# Patient Record
Sex: MALE | Race: WHITE | NOT HISPANIC OR LATINO | Employment: FULL TIME | URBAN - METROPOLITAN AREA
[De-identification: names, ages, dates, MRNs, and addresses within clinical notes are randomized per-mention and may not be internally consistent; named-entity substitution may affect disease eponyms.]

---

## 2017-01-13 ENCOUNTER — OFFICE VISIT (OUTPATIENT)
Dept: URGENT CARE | Facility: CLINIC | Age: 27
End: 2017-01-13
Payer: COMMERCIAL

## 2017-01-13 PROCEDURE — 99204 OFFICE O/P NEW MOD 45 MIN: CPT

## 2017-06-21 ENCOUNTER — OFFICE VISIT (OUTPATIENT)
Dept: URGENT CARE | Facility: CLINIC | Age: 27
End: 2017-06-21
Payer: COMMERCIAL

## 2017-06-21 PROCEDURE — 99213 OFFICE O/P EST LOW 20 MIN: CPT

## 2017-10-09 ENCOUNTER — APPOINTMENT (OUTPATIENT)
Dept: PHYSICAL THERAPY | Facility: CLINIC | Age: 27
End: 2017-10-09
Payer: COMMERCIAL

## 2017-10-09 PROCEDURE — G8991 OTHER PT/OT GOAL STATUS: HCPCS

## 2017-10-09 PROCEDURE — G8990 OTHER PT/OT CURRENT STATUS: HCPCS

## 2017-10-09 PROCEDURE — 97162 PT EVAL MOD COMPLEX 30 MIN: CPT

## 2017-10-09 PROCEDURE — 97110 THERAPEUTIC EXERCISES: CPT

## 2017-10-12 ENCOUNTER — APPOINTMENT (OUTPATIENT)
Dept: PHYSICAL THERAPY | Facility: CLINIC | Age: 27
End: 2017-10-12
Payer: COMMERCIAL

## 2017-10-12 PROCEDURE — 97140 MANUAL THERAPY 1/> REGIONS: CPT

## 2017-10-12 PROCEDURE — 97110 THERAPEUTIC EXERCISES: CPT

## 2017-10-18 ENCOUNTER — APPOINTMENT (OUTPATIENT)
Dept: PHYSICAL THERAPY | Facility: CLINIC | Age: 27
End: 2017-10-18
Payer: COMMERCIAL

## 2017-10-19 ENCOUNTER — APPOINTMENT (OUTPATIENT)
Dept: PHYSICAL THERAPY | Facility: CLINIC | Age: 27
End: 2017-10-19
Payer: COMMERCIAL

## 2018-10-30 ENCOUNTER — TRANSCRIBE ORDERS (OUTPATIENT)
Dept: URGENT CARE | Facility: CLINIC | Age: 28
End: 2018-10-30

## 2018-10-30 ENCOUNTER — APPOINTMENT (OUTPATIENT)
Dept: LAB | Facility: CLINIC | Age: 28
End: 2018-10-30
Payer: COMMERCIAL

## 2018-10-30 DIAGNOSIS — Z00.00 ROUTINE GENERAL MEDICAL EXAMINATION AT A HEALTH CARE FACILITY: Primary | ICD-10-CM

## 2018-10-30 LAB
25(OH)D3 SERPL-MCNC: 17.3 NG/ML (ref 30–100)
ALBUMIN SERPL BCP-MCNC: 4.1 G/DL (ref 3.5–5)
ALP SERPL-CCNC: 66 U/L (ref 46–116)
ALT SERPL W P-5'-P-CCNC: 61 U/L (ref 12–78)
ANION GAP SERPL CALCULATED.3IONS-SCNC: 4 MMOL/L (ref 4–13)
AST SERPL W P-5'-P-CCNC: 28 U/L (ref 5–45)
BASOPHILS # BLD AUTO: 0.03 THOUSANDS/ΜL (ref 0–0.1)
BASOPHILS NFR BLD AUTO: 1 % (ref 0–1)
BILIRUB DIRECT SERPL-MCNC: 0.18 MG/DL (ref 0–0.2)
BILIRUB SERPL-MCNC: 0.74 MG/DL (ref 0.2–1)
BUN SERPL-MCNC: 13 MG/DL (ref 5–25)
CALCIUM SERPL-MCNC: 9.1 MG/DL (ref 8.3–10.1)
CHLORIDE SERPL-SCNC: 101 MMOL/L (ref 100–108)
CHOLEST SERPL-MCNC: 240 MG/DL (ref 50–200)
CO2 SERPL-SCNC: 29 MMOL/L (ref 21–32)
CREAT SERPL-MCNC: 0.97 MG/DL (ref 0.6–1.3)
EOSINOPHIL # BLD AUTO: 0.08 THOUSAND/ΜL (ref 0–0.61)
EOSINOPHIL NFR BLD AUTO: 1 % (ref 0–6)
ERYTHROCYTE [DISTWIDTH] IN BLOOD BY AUTOMATED COUNT: 13 % (ref 11.6–15.1)
FERRITIN SERPL-MCNC: 90 NG/ML (ref 8–388)
GFR SERPL CREATININE-BSD FRML MDRD: 105 ML/MIN/1.73SQ M
GLUCOSE P FAST SERPL-MCNC: 88 MG/DL (ref 65–99)
HCT VFR BLD AUTO: 46.8 % (ref 36.5–49.3)
HDLC SERPL-MCNC: 40 MG/DL (ref 40–60)
HGB BLD-MCNC: 15.5 G/DL (ref 12–17)
IMM GRANULOCYTES # BLD AUTO: 0.01 THOUSAND/UL (ref 0–0.2)
IMM GRANULOCYTES NFR BLD AUTO: 0 % (ref 0–2)
IRON SATN MFR SERPL: 47 %
IRON SERPL-MCNC: 156 UG/DL (ref 65–175)
LDLC SERPL CALC-MCNC: 161 MG/DL (ref 0–100)
LYMPHOCYTES # BLD AUTO: 1.93 THOUSANDS/ΜL (ref 0.6–4.47)
LYMPHOCYTES NFR BLD AUTO: 31 % (ref 14–44)
MCH RBC QN AUTO: 29.6 PG (ref 26.8–34.3)
MCHC RBC AUTO-ENTMCNC: 33.1 G/DL (ref 31.4–37.4)
MCV RBC AUTO: 89 FL (ref 82–98)
MONOCYTES # BLD AUTO: 0.62 THOUSAND/ΜL (ref 0.17–1.22)
MONOCYTES NFR BLD AUTO: 10 % (ref 4–12)
NEUTROPHILS # BLD AUTO: 3.53 THOUSANDS/ΜL (ref 1.85–7.62)
NEUTS SEG NFR BLD AUTO: 57 % (ref 43–75)
NONHDLC SERPL-MCNC: 200 MG/DL
NRBC BLD AUTO-RTO: 0 /100 WBCS
PLATELET # BLD AUTO: 252 THOUSANDS/UL (ref 149–390)
PMV BLD AUTO: 10.5 FL (ref 8.9–12.7)
POTASSIUM SERPL-SCNC: 4.2 MMOL/L (ref 3.5–5.3)
PROT SERPL-MCNC: 8.2 G/DL (ref 6.4–8.2)
RBC # BLD AUTO: 5.24 MILLION/UL (ref 3.88–5.62)
SODIUM SERPL-SCNC: 134 MMOL/L (ref 136–145)
TIBC SERPL-MCNC: 332 UG/DL (ref 250–450)
TRIGL SERPL-MCNC: 193 MG/DL
VIT B12 SERPL-MCNC: 675 PG/ML (ref 100–900)
WBC # BLD AUTO: 6.2 THOUSAND/UL (ref 4.31–10.16)

## 2018-10-30 PROCEDURE — 80048 BASIC METABOLIC PNL TOTAL CA: CPT

## 2018-10-30 PROCEDURE — 86480 TB TEST CELL IMMUN MEASURE: CPT

## 2018-10-30 PROCEDURE — 80061 LIPID PANEL: CPT

## 2018-10-30 PROCEDURE — 83550 IRON BINDING TEST: CPT

## 2018-10-30 PROCEDURE — 36415 COLL VENOUS BLD VENIPUNCTURE: CPT

## 2018-10-30 PROCEDURE — 82607 VITAMIN B-12: CPT

## 2018-10-30 PROCEDURE — 82306 VITAMIN D 25 HYDROXY: CPT

## 2018-10-30 PROCEDURE — 82728 ASSAY OF FERRITIN: CPT

## 2018-10-30 PROCEDURE — 83540 ASSAY OF IRON: CPT

## 2018-10-30 PROCEDURE — 85025 COMPLETE CBC W/AUTO DIFF WBC: CPT

## 2018-10-30 PROCEDURE — 80076 HEPATIC FUNCTION PANEL: CPT

## 2018-11-01 LAB
GAMMA INTERFERON BACKGROUND BLD IA-ACNC: 0.05 IU/ML
M TB IFN-G BLD-IMP: NEGATIVE
M TB IFN-G CD4+ BCKGRND COR BLD-ACNC: 0 IU/ML
M TB IFN-G CD4+ BCKGRND COR BLD-ACNC: 0 IU/ML
MITOGEN IGNF BCKGRD COR BLD-ACNC: >10 IU/ML

## 2019-10-01 ENCOUNTER — TRANSCRIBE ORDERS (OUTPATIENT)
Dept: LAB | Facility: CLINIC | Age: 29
End: 2019-10-01

## 2019-10-01 ENCOUNTER — APPOINTMENT (OUTPATIENT)
Dept: LAB | Facility: CLINIC | Age: 29
End: 2019-10-01
Payer: COMMERCIAL

## 2019-10-01 ENCOUNTER — OFFICE VISIT (OUTPATIENT)
Dept: FAMILY MEDICINE CLINIC | Facility: CLINIC | Age: 29
End: 2019-10-01
Payer: COMMERCIAL

## 2019-10-01 VITALS
TEMPERATURE: 97.8 F | SYSTOLIC BLOOD PRESSURE: 140 MMHG | WEIGHT: 228 LBS | HEIGHT: 77 IN | DIASTOLIC BLOOD PRESSURE: 90 MMHG | BODY MASS INDEX: 26.92 KG/M2

## 2019-10-01 DIAGNOSIS — Z13.31 NEGATIVE DEPRESSION SCREENING: ICD-10-CM

## 2019-10-01 DIAGNOSIS — Z11.1 SCREENING FOR TUBERCULOSIS: ICD-10-CM

## 2019-10-01 DIAGNOSIS — E66.3 OVERWEIGHT (BMI 25.0-29.9): ICD-10-CM

## 2019-10-01 DIAGNOSIS — A15.9 TUBERCULOSIS: ICD-10-CM

## 2019-10-01 DIAGNOSIS — Z13.6 SCREENING FOR CARDIOVASCULAR CONDITION: ICD-10-CM

## 2019-10-01 DIAGNOSIS — E78.00 HYPERCHOLESTEROLEMIA: Primary | ICD-10-CM

## 2019-10-01 DIAGNOSIS — I10 ESSENTIAL HYPERTENSION: ICD-10-CM

## 2019-10-01 LAB
ALBUMIN SERPL BCP-MCNC: 4.1 G/DL (ref 3.5–5)
ALP SERPL-CCNC: 55 U/L (ref 46–116)
ALT SERPL W P-5'-P-CCNC: 23 U/L (ref 12–78)
ANION GAP SERPL CALCULATED.3IONS-SCNC: 5 MMOL/L (ref 4–13)
AST SERPL W P-5'-P-CCNC: 20 U/L (ref 5–45)
BASOPHILS # BLD AUTO: 0.03 THOUSANDS/ΜL (ref 0–0.1)
BASOPHILS NFR BLD AUTO: 1 % (ref 0–1)
BILIRUB SERPL-MCNC: 0.46 MG/DL (ref 0.2–1)
BUN SERPL-MCNC: 20 MG/DL (ref 5–25)
CALCIUM SERPL-MCNC: 9.3 MG/DL (ref 8.3–10.1)
CHLORIDE SERPL-SCNC: 106 MMOL/L (ref 100–108)
CHOLEST SERPL-MCNC: 192 MG/DL (ref 50–200)
CO2 SERPL-SCNC: 26 MMOL/L (ref 21–32)
CREAT SERPL-MCNC: 0.9 MG/DL (ref 0.6–1.3)
EOSINOPHIL # BLD AUTO: 0.1 THOUSAND/ΜL (ref 0–0.61)
EOSINOPHIL NFR BLD AUTO: 2 % (ref 0–6)
ERYTHROCYTE [DISTWIDTH] IN BLOOD BY AUTOMATED COUNT: 13.2 % (ref 11.6–15.1)
GFR SERPL CREATININE-BSD FRML MDRD: 114 ML/MIN/1.73SQ M
GLUCOSE P FAST SERPL-MCNC: 80 MG/DL (ref 65–99)
HCT VFR BLD AUTO: 44.7 % (ref 36.5–49.3)
HDLC SERPL-MCNC: 40 MG/DL (ref 40–60)
HGB BLD-MCNC: 14.8 G/DL (ref 12–17)
IMM GRANULOCYTES # BLD AUTO: 0.01 THOUSAND/UL (ref 0–0.2)
IMM GRANULOCYTES NFR BLD AUTO: 0 % (ref 0–2)
LDLC SERPL CALC-MCNC: 139 MG/DL (ref 0–100)
LYMPHOCYTES # BLD AUTO: 1.5 THOUSANDS/ΜL (ref 0.6–4.47)
LYMPHOCYTES NFR BLD AUTO: 26 % (ref 14–44)
MCH RBC QN AUTO: 30.5 PG (ref 26.8–34.3)
MCHC RBC AUTO-ENTMCNC: 33.1 G/DL (ref 31.4–37.4)
MCV RBC AUTO: 92 FL (ref 82–98)
MONOCYTES # BLD AUTO: 0.57 THOUSAND/ΜL (ref 0.17–1.22)
MONOCYTES NFR BLD AUTO: 10 % (ref 4–12)
NEUTROPHILS # BLD AUTO: 3.56 THOUSANDS/ΜL (ref 1.85–7.62)
NEUTS SEG NFR BLD AUTO: 61 % (ref 43–75)
NONHDLC SERPL-MCNC: 152 MG/DL
NRBC BLD AUTO-RTO: 0 /100 WBCS
PLATELET # BLD AUTO: 215 THOUSANDS/UL (ref 149–390)
PMV BLD AUTO: 11.3 FL (ref 8.9–12.7)
POTASSIUM SERPL-SCNC: 3.7 MMOL/L (ref 3.5–5.3)
PROT SERPL-MCNC: 7.6 G/DL (ref 6.4–8.2)
RBC # BLD AUTO: 4.86 MILLION/UL (ref 3.88–5.62)
SODIUM SERPL-SCNC: 137 MMOL/L (ref 136–145)
TRIGL SERPL-MCNC: 66 MG/DL
TSH SERPL DL<=0.05 MIU/L-ACNC: 2.11 UIU/ML (ref 0.36–3.74)
WBC # BLD AUTO: 5.77 THOUSAND/UL (ref 4.31–10.16)

## 2019-10-01 PROCEDURE — 80053 COMPREHEN METABOLIC PANEL: CPT

## 2019-10-01 PROCEDURE — 36415 COLL VENOUS BLD VENIPUNCTURE: CPT

## 2019-10-01 PROCEDURE — 80061 LIPID PANEL: CPT

## 2019-10-01 PROCEDURE — 3008F BODY MASS INDEX DOCD: CPT | Performed by: FAMILY MEDICINE

## 2019-10-01 PROCEDURE — 84443 ASSAY THYROID STIM HORMONE: CPT

## 2019-10-01 PROCEDURE — 86480 TB TEST CELL IMMUN MEASURE: CPT

## 2019-10-01 PROCEDURE — 99203 OFFICE O/P NEW LOW 30 MIN: CPT | Performed by: FAMILY MEDICINE

## 2019-10-01 PROCEDURE — 85025 COMPLETE CBC W/AUTO DIFF WBC: CPT

## 2019-10-01 NOTE — PROGRESS NOTES
Assessment/Plan:    No problem-specific Assessment & Plan notes found for this encounter  Diagnoses and all orders for this visit:    Hypercholesterolemia    Essential hypertension    Screening for cardiovascular condition  -     CBC and differential; Future  -     Comprehensive metabolic panel; Future  -     Lipid panel; Future  -     TSH, 3rd generation with Free T4 reflex; Future    Overweight (BMI 25 0-29  9)    Negative depression screening          PHQ-9 Depression Screening    PHQ-9:    Frequency of the following problems over the past two weeks:       Little interest or pleasure in doing things:  0 - not at all  Feeling down, depressed, or hopeless:  0 - not at all  PHQ-2 Score:  0        BMI Counseling: Body mass index is 27 39 kg/m²  The BMI is above normal  Nutrition recommendations include reducing portion sizes and 3-5 servings of fruits/vegetables daily  Exercise recommendations include moderate aerobic physical activity for 150 minutes/week and exercising 3-5 times per week  Subjective:      Patient ID: Erin Almanza is a 26 y o  male  New pt here to establish with high cholesterol, low vitamin D, facial dermatitis, seasonal affective disorder, no past surgeries, quit smoking 4 years ago, reg EtoH, no illegal drugs, , 3 children, medical device rep      The following portions of the patient's history were reviewed and updated as appropriate: allergies, current medications, past family history, past medical history, past social history, past surgical history and problem list     Review of Systems   Constitutional: Positive for fatigue  Negative for chills and fever  HENT: Negative  Eyes: Negative  Respiratory: Negative for shortness of breath and wheezing  Cardiovascular: Negative for chest pain and palpitations  Gastrointestinal: Negative for abdominal pain, blood in stool, constipation, diarrhea, nausea and vomiting  Endocrine: Negative      Genitourinary: Negative for difficulty urinating and dysuria  Musculoskeletal: Negative for arthralgias and myalgias  Skin: Negative  Allergic/Immunologic: Negative  Neurological: Negative for seizures and syncope  Hematological: Negative for adenopathy  Psychiatric/Behavioral: Negative  Objective:    /90   Temp 97 8 °F (36 6 °C)   Ht 6' 4 5" (1 943 m)   Wt 103 kg (228 lb)   BMI 27 39 kg/m²      Physical Exam   Constitutional: He is oriented to person, place, and time  He appears well-developed and well-nourished  No distress  HENT:   Head: Normocephalic and atraumatic  Right Ear: External ear normal    Left Ear: External ear normal    Nose: Nose normal    Mouth/Throat: Oropharynx is clear and moist    Eyes: Pupils are equal, round, and reactive to light  Conjunctivae and EOM are normal  No scleral icterus  Neck: Normal range of motion  Neck supple  Cardiovascular: Normal rate, regular rhythm and normal heart sounds  Exam reveals no gallop and no friction rub  No murmur heard  Pulmonary/Chest: Effort normal and breath sounds normal  No respiratory distress  He has no wheezes  He has no rales  Abdominal: Soft  Bowel sounds are normal  He exhibits no distension and no mass  There is no tenderness  There is no rebound and no guarding  Musculoskeletal: Normal range of motion  He exhibits no edema  Lymphadenopathy:     He has no cervical adenopathy  Neurological: He is alert and oriented to person, place, and time  He has normal reflexes  Skin: Skin is warm and dry  He is not diaphoretic  Psychiatric: He has a normal mood and affect   His behavior is normal  Judgment and thought content normal

## 2019-10-02 LAB
GAMMA INTERFERON BACKGROUND BLD IA-ACNC: 0.06 IU/ML
M TB IFN-G BLD-IMP: NEGATIVE
M TB IFN-G CD4+ BCKGRND COR BLD-ACNC: 0 IU/ML
M TB IFN-G CD4+ BCKGRND COR BLD-ACNC: 0 IU/ML
MITOGEN IGNF BCKGRD COR BLD-ACNC: >10 IU/ML

## 2020-11-23 ENCOUNTER — OFFICE VISIT (OUTPATIENT)
Dept: URGENT CARE | Facility: CLINIC | Age: 30
End: 2020-11-23
Payer: COMMERCIAL

## 2020-11-23 VITALS
HEART RATE: 78 BPM | SYSTOLIC BLOOD PRESSURE: 157 MMHG | TEMPERATURE: 97.8 F | DIASTOLIC BLOOD PRESSURE: 92 MMHG | OXYGEN SATURATION: 98 % | RESPIRATION RATE: 18 BRPM

## 2020-11-23 DIAGNOSIS — B02.9 HERPES ZOSTER WITHOUT COMPLICATION: Primary | ICD-10-CM

## 2020-11-23 PROCEDURE — G0382 LEV 3 HOSP TYPE B ED VISIT: HCPCS | Performed by: NURSE PRACTITIONER

## 2020-11-23 RX ORDER — VALACYCLOVIR HYDROCHLORIDE 1 G/1
1000 TABLET, FILM COATED ORAL 3 TIMES DAILY
Qty: 21 TABLET | Refills: 0 | Status: SHIPPED | OUTPATIENT
Start: 2020-11-23 | End: 2020-11-30

## 2024-04-09 ENCOUNTER — HOSPITAL ENCOUNTER (EMERGENCY)
Facility: HOSPITAL | Age: 34
Discharge: HOME | End: 2024-04-09
Attending: EMERGENCY MEDICINE
Payer: COMMERCIAL

## 2024-04-09 ENCOUNTER — APPOINTMENT (EMERGENCY)
Dept: CT IMAGING | Facility: HOSPITAL | Age: 34
End: 2024-04-09
Payer: COMMERCIAL

## 2024-04-09 VITALS
RESPIRATION RATE: 18 BRPM | TEMPERATURE: 97.1 F | OXYGEN SATURATION: 98 % | SYSTOLIC BLOOD PRESSURE: 139 MMHG | BODY MASS INDEX: 28.46 KG/M2 | DIASTOLIC BLOOD PRESSURE: 86 MMHG | HEIGHT: 77 IN | HEART RATE: 78 BPM | WEIGHT: 241 LBS

## 2024-04-09 DIAGNOSIS — R10.9 ABDOMINAL PAIN: Primary | ICD-10-CM

## 2024-04-09 LAB
ALBUMIN SERPL BCP-MCNC: 5 G/DL (ref 3.5–5)
ALP SERPL-CCNC: 71 U/L (ref 34–104)
ALT SERPL W P-5'-P-CCNC: 22 U/L (ref 7–52)
ANION GAP SERPL CALCULATED.3IONS-SCNC: 9 MMOL/L (ref 4–13)
AST SERPL W P-5'-P-CCNC: 16 U/L (ref 13–39)
BASOPHILS # BLD AUTO: 0.03 THOUSANDS/ÂΜL (ref 0–0.1)
BASOPHILS NFR BLD AUTO: 0 % (ref 0–1)
BILIRUB SERPL-MCNC: 0.57 MG/DL (ref 0.2–1)
BILIRUB UR QL STRIP: NEGATIVE
BUN SERPL-MCNC: 14 MG/DL (ref 5–25)
CALCIUM SERPL-MCNC: 9.8 MG/DL (ref 8.4–10.2)
CHLORIDE SERPL-SCNC: 101 MMOL/L (ref 96–108)
CLARITY UR: CLEAR
CO2 SERPL-SCNC: 27 MMOL/L (ref 21–32)
COLOR UR: YELLOW
CREAT SERPL-MCNC: 0.94 MG/DL (ref 0.6–1.3)
EOSINOPHIL # BLD AUTO: 0.06 THOUSAND/ÂΜL (ref 0–0.61)
EOSINOPHIL NFR BLD AUTO: 1 % (ref 0–6)
ERYTHROCYTE [DISTWIDTH] IN BLOOD BY AUTOMATED COUNT: 12.6 % (ref 11.6–15.1)
GFR SERPL CREATININE-BSD FRML MDRD: 105 ML/MIN/1.73SQ M
GLUCOSE SERPL-MCNC: 118 MG/DL (ref 65–140)
GLUCOSE UR STRIP-MCNC: NEGATIVE MG/DL
HCT VFR BLD AUTO: 47.9 % (ref 36.5–49.3)
HGB BLD-MCNC: 16.2 G/DL (ref 12–17)
HGB UR QL STRIP.AUTO: NEGATIVE
IMM GRANULOCYTES # BLD AUTO: 0.02 THOUSAND/UL (ref 0–0.2)
IMM GRANULOCYTES NFR BLD AUTO: 0 % (ref 0–2)
KETONES UR STRIP-MCNC: NEGATIVE MG/DL
LEUKOCYTE ESTERASE UR QL STRIP: NEGATIVE
LIPASE SERPL-CCNC: 14 U/L (ref 11–82)
LYMPHOCYTES # BLD AUTO: 2.48 THOUSANDS/ÂΜL (ref 0.6–4.47)
LYMPHOCYTES NFR BLD AUTO: 27 % (ref 14–44)
MCH RBC QN AUTO: 30.6 PG (ref 26.8–34.3)
MCHC RBC AUTO-ENTMCNC: 33.8 G/DL (ref 31.4–37.4)
MCV RBC AUTO: 90 FL (ref 82–98)
MONOCYTES # BLD AUTO: 0.67 THOUSAND/ÂΜL (ref 0.17–1.22)
MONOCYTES NFR BLD AUTO: 7 % (ref 4–12)
NEUTROPHILS # BLD AUTO: 6 THOUSANDS/ÂΜL (ref 1.85–7.62)
NEUTS SEG NFR BLD AUTO: 65 % (ref 43–75)
NITRITE UR QL STRIP: NEGATIVE
NRBC BLD AUTO-RTO: 0 /100 WBCS
PH UR STRIP.AUTO: 7.5 [PH]
PLATELET # BLD AUTO: 305 THOUSANDS/UL (ref 149–390)
PMV BLD AUTO: 9.2 FL (ref 8.9–12.7)
POTASSIUM SERPL-SCNC: 4.1 MMOL/L (ref 3.5–5.3)
PROT SERPL-MCNC: 8.2 G/DL (ref 6.4–8.4)
PROT UR STRIP-MCNC: NEGATIVE MG/DL
RBC # BLD AUTO: 5.3 MILLION/UL (ref 3.88–5.62)
SODIUM SERPL-SCNC: 137 MMOL/L (ref 135–147)
SP GR UR STRIP.AUTO: 1.01
UROBILINOGEN UR QL STRIP.AUTO: 0.2 E.U./DL
WBC # BLD AUTO: 9.26 THOUSAND/UL (ref 4.31–10.16)

## 2024-04-09 PROCEDURE — 85025 COMPLETE CBC W/AUTO DIFF WBC: CPT | Performed by: EMERGENCY MEDICINE

## 2024-04-09 PROCEDURE — 81003 URINALYSIS AUTO W/O SCOPE: CPT | Performed by: EMERGENCY MEDICINE

## 2024-04-09 PROCEDURE — 80053 COMPREHEN METABOLIC PANEL: CPT | Performed by: EMERGENCY MEDICINE

## 2024-04-09 PROCEDURE — 99285 EMERGENCY DEPT VISIT HI MDM: CPT | Performed by: EMERGENCY MEDICINE

## 2024-04-09 PROCEDURE — 36415 COLL VENOUS BLD VENIPUNCTURE: CPT | Performed by: EMERGENCY MEDICINE

## 2024-04-09 PROCEDURE — 74177 CT ABD & PELVIS W/CONTRAST: CPT

## 2024-04-09 PROCEDURE — 83690 ASSAY OF LIPASE: CPT | Performed by: EMERGENCY MEDICINE

## 2024-04-09 RX ORDER — HYDROCODONE BITARTRATE AND ACETAMINOPHEN 5; 325 MG/1; MG/1
1 TABLET ORAL EVERY 6 HOURS PRN
Qty: 10 TABLET | Refills: 0 | Status: SHIPPED | OUTPATIENT
Start: 2024-04-09

## 2024-04-09 RX ORDER — AMOXICILLIN AND CLAVULANATE POTASSIUM 875; 125 MG/1; MG/1
1 TABLET, FILM COATED ORAL EVERY 12 HOURS
Qty: 20 TABLET | Refills: 0 | Status: SHIPPED | OUTPATIENT
Start: 2024-04-09 | End: 2024-04-19

## 2024-04-09 RX ORDER — ONDANSETRON 2 MG/ML
4 INJECTION INTRAMUSCULAR; INTRAVENOUS ONCE
Status: COMPLETED | OUTPATIENT
Start: 2024-04-09 | End: 2024-04-09

## 2024-04-09 RX ORDER — MORPHINE SULFATE 4 MG/ML
4 INJECTION, SOLUTION INTRAMUSCULAR; INTRAVENOUS ONCE
Status: COMPLETED | OUTPATIENT
Start: 2024-04-09 | End: 2024-04-09

## 2024-04-09 RX ORDER — AMOXICILLIN AND CLAVULANATE POTASSIUM 875; 125 MG/1; MG/1
1 TABLET, FILM COATED ORAL ONCE
Status: COMPLETED | OUTPATIENT
Start: 2024-04-09 | End: 2024-04-09

## 2024-04-09 RX ADMIN — SODIUM CHLORIDE 1000 ML: 0.9 INJECTION, SOLUTION INTRAVENOUS at 05:29

## 2024-04-09 RX ADMIN — AMOXICILLIN AND CLAVULANATE POTASSIUM 1 TABLET: 875; 125 TABLET, FILM COATED ORAL at 07:19

## 2024-04-09 RX ADMIN — MORPHINE SULFATE 4 MG: 4 INJECTION INTRAVENOUS at 05:26

## 2024-04-09 RX ADMIN — IOHEXOL 100 ML: 350 INJECTION, SOLUTION INTRAVENOUS at 05:56

## 2024-04-09 RX ADMIN — ONDANSETRON 4 MG: 2 INJECTION INTRAMUSCULAR; INTRAVENOUS at 05:26

## 2024-04-09 NOTE — DISCHARGE INSTRUCTIONS
RETURN IF WORSE IN ANY WAY:   WORSENING PAIN,   FEVER OR FLU LIKE SYMPTOMS,   OR NEW AND CONCERNING SYMPTOMS SIGNS OR SYMPTOMS      PLEASE CALL YOUR PRIMARY DOCTOR IN THE MORNING TO SET UP FOLLOW UP for TOMORROW or the Next day  PLEASE REVIEW THE WORK UP RESULTS WITH YOUR DOCTOR  Please continue to drink plenty of fluids.    You can take Zofran for nausea or vomiting    USE EXTREME CAUTION WHILE TAKING NARCOTICS FOR PAIN  ONLY TAKE FOR ACUTE PAIN  NEVER TAKE WITH OTHER SEDATIVE MEDICATIONS OR SUBSTANCES, SUCH AS SLEEPING MEDICATIONS OR ALCOHOL OR OTHER SUCH SUBSTANCES  YOU MAY NEED TO TAKE LAXATIVES WHILE TAKING THIS MEDICATION  PLEASE DISCUSS THE ABOVE WITH YOUR FAMILY DOCTOR     Holter monitor placement for 48 hours only

## 2024-04-09 NOTE — ED PROVIDER NOTES
History  Chief Complaint   Patient presents with    Abdominal Pain     Started 4 days ago. Pain is all over after eating any food, when the pain dies down it localizes to lower left abd. Nausea, diarrhea- but more constipation. No vomiting.        33-YEAR-OLD MALE    PMH:   HLD    HPI:  PATIENT IS HERE FOR GENERALIZED ABDOMINAL PAIN    HPI:  PAIN STARTED 4 DAYS AGO   IT HAS BEEN COMING AND GOING  IT IS NOW  RATED 6/10  WAS 8-9/10  DESCRIBED AS SHARP      ASSOCIATED SYMPTOMS:  URINARY  SYMPTOMS: THERE IS NO DYSURIA, NO HEMATURIA, NO FREQUENCY  HE REPORTS NAUSEA, BUT DENIES ANY VOMITING.    DIARRHEA ONCE LAST NIGHT  NO BLOOD  DENIES FEVERS, BUT DOES REPORT CHILLS    NO BLOODY STOOLS - NOT BLACK OR BLOODY      ALLEVIATING OR EXACERBATING FACTORS:  UNCERTAIN  SOMETIMES WORSE W/ FOOD      INTERVENTIONS:   TYLENOL, MOTRIN, SIMETHICONE, COLACE         History provided by:  Patient      Prior to Admission Medications   Prescriptions Last Dose Informant Patient Reported? Taking?   valACYclovir (VALTREX) 1,000 mg tablet   No No   Sig: Take 1 tablet (1,000 mg total) by mouth 3 (three) times a day for 7 days      Facility-Administered Medications: None       Past Medical History:   Diagnosis Date    Allergic     Seasonal       History reviewed. No pertinent surgical history.    Family History   Problem Relation Age of Onset    Alcohol abuse Maternal Grandmother     Arthritis Maternal Grandmother     Depression Maternal Grandmother     Drug abuse Maternal Grandmother     Mental illness Maternal Grandmother     Psychosis Maternal Grandmother     Arthritis Mother     Depression Mother     Hyperlipidemia Mother     Anxiety disorder Mother     Arthritis Paternal Grandmother     Depression Paternal Grandmother     Arthritis Maternal Grandfather     Cancer Maternal Aunt     Vision loss Maternal Aunt     Depression Father     Hyperlipidemia Father     Early death Paternal Grandfather     Psoriasis Daughter     Anxiety disorder Sister       I have reviewed and agree with the history as documented.    E-Cigarette/Vaping     E-Cigarette/Vaping Substances     Social History     Tobacco Use    Smoking status: Former     Current packs/day: 1.00     Average packs/day: 1 pack/day for 10.0 years (10.0 ttl pk-yrs)     Types: Cigarettes, Pipe, Cigars    Smokeless tobacco: Former   Substance Use Topics    Alcohol use: Yes     Alcohol/week: 10.0 standard drinks of alcohol     Types: 10 Standard drinks or equivalent per week     Comment: Sometimes, other times no drinks for weeks.    Drug use: Never       Review of Systems   Constitutional:  Negative for chills, diaphoresis, fatigue and fever.   Respiratory:  Negative for cough, shortness of breath, wheezing and stridor.    Cardiovascular:  Negative for chest pain, palpitations and leg swelling.   Gastrointestinal:  Positive for abdominal pain and diarrhea (ONCE LAST NIGHT). Negative for blood in stool, nausea and vomiting.   Genitourinary:  Negative for decreased urine volume, difficulty urinating, dysuria, flank pain, frequency, hematuria, penile discharge, penile pain, penile swelling, scrotal swelling, testicular pain and urgency.   Musculoskeletal:  Negative for arthralgias, back pain, gait problem, joint swelling, myalgias, neck pain and neck stiffness.   Skin:  Negative for rash and wound.   Neurological:  Negative for dizziness, light-headedness and headaches.   All other systems reviewed and are negative.      Physical Exam  Physical Exam  Constitutional:       General: He is not in acute distress.     Appearance: He is well-developed. He is not ill-appearing, toxic-appearing or diaphoretic.   HENT:      Head: Normocephalic and atraumatic.      Nose: Nose normal.      Mouth/Throat:      Pharynx: No pharyngeal swelling or oropharyngeal exudate.   Eyes:      General: No scleral icterus.        Right eye: No discharge.         Left eye: No discharge.      Conjunctiva/sclera: Conjunctivae normal.       Pupils: Pupils are equal, round, and reactive to light.   Neck:      Vascular: No JVD.      Trachea: No tracheal deviation.   Cardiovascular:      Rate and Rhythm: Normal rate and regular rhythm.      Heart sounds: Normal heart sounds. No murmur heard.     No friction rub. No gallop.   Pulmonary:      Effort: Pulmonary effort is normal. No respiratory distress.      Breath sounds: Normal breath sounds. No stridor. No wheezing, rhonchi or rales.   Chest:      Chest wall: No tenderness.   Abdominal:      General: Bowel sounds are normal. There is no distension.      Palpations: Abdomen is soft. There is no mass.      Tenderness: There is abdominal tenderness in the left lower quadrant. There is no guarding or rebound.      Hernia: No hernia is present.   Musculoskeletal:         General: No tenderness or deformity. Normal range of motion.      Cervical back: Normal range of motion and neck supple.   Lymphadenopathy:      Cervical: No cervical adenopathy.   Skin:     General: Skin is warm.      Capillary Refill: Capillary refill takes less than 2 seconds.      Coloration: Skin is not cyanotic, jaundiced, mottled or pale.      Findings: No erythema or rash.   Neurological:      General: No focal deficit present.      Mental Status: He is alert and oriented to person, place, and time.      Cranial Nerves: No cranial nerve deficit.      Sensory: No sensory deficit.      Motor: No weakness or abnormal muscle tone.      Coordination: Coordination normal.   Psychiatric:         Mood and Affect: Mood normal.         Behavior: Behavior normal.         Thought Content: Thought content normal.         Judgment: Judgment normal.         Vital Signs  ED Triage Vitals [04/09/24 0507]   Temperature Pulse Respirations Blood Pressure SpO2   (!) 97.1 °F (36.2 °C) 86 18 145/99 98 %      Temp Source Heart Rate Source Patient Position - Orthostatic VS BP Location FiO2 (%)   Tympanic Monitor Sitting Left arm --      Pain Score       6            Vitals:    04/09/24 0507   BP: 145/99   Pulse: 86   Patient Position - Orthostatic VS: Sitting         Visual Acuity      ED Medications  Medications - No data to display    Diagnostic Studies  Results Reviewed       Procedure Component Value Units Date/Time    CBC and differential [[id number]]     Lab Status: No result Specimen: Blood     CMP [[id number]]     Lab Status: No result Specimen: Blood     Lipase [[id number]]     Lab Status: No result Specimen: Blood     UA w Reflex to Microscopic w Reflex to Culture [[id number]]     Lab Status: No result Specimen: Urine                    No orders to display              Procedures  Procedures         ED Course  ED Course as of 04/10/24 0631   Tue Apr 09, 2024   0543 CBC and differential   0543 CMP   0544 Lipase   0700 CT ABDOMEN AND PELVIS WITH IV CONTRAST        FINDINGS:     ABDOMEN     LOWER CHEST: No clinically significant abnormality in the visualized lower chest.     LIVER/BILIARY TREE: Subcentimeter hypoattenuating lesion(s), too small to characterize but statistically likely benign, which do not require follow-up (ACR White Paper 2017). No suspicious mass. Normal hepatic contours. No biliary dilation.     GALLBLADDER: No calcified gallstones. No pericholecystic inflammatory change.     SPLEEN: Unremarkable.     PANCREAS: Unremarkable.     ADRENAL GLANDS: Unremarkable.     KIDNEYS/URETERS: No hydronephrosis or urinary tract calculi. Subcentimeter hypoattenuating renal lesion(s), too small to characterize but statistically likely benign, which do not warrant follow-up (Radiology June 2019). No hydronephrosis. Small   bilateral extrarenal pelvis, right larger than left.     STOMACH AND BOWEL: Scattered colonic diverticula without diverticulitis.     APPENDIX: Normal.     ABDOMINOPELVIC CAVITY: No ascites. No pneumoperitoneum. No lymphadenopathy.     VESSELS: Unremarkable for patient's age.     PELVIS     REPRODUCTIVE ORGANS: Unremarkable for patient's age.      URINARY BLADDER: Unremarkable.     ABDOMINAL WALL/INGUINAL REGIONS: Small fat-containing umbilical hernia. Small fat-containing bilateral inguinal hernias, left larger than right.     BONES: No acute fracture or osseous destructive lesion identified.  Degenerative changes of the spine and bilateral hips.     IMPRESSION:     No evidence of acute abdominopelvic process.     Mild colonic diverticulosis without diverticulitis.      0713 Pt understands work up results  No concerning findings    Pt feels much much better    She has no signs of life or limb threatening process    I offered further tx, I offered admission, if she felt ill, or her pain was too great, but she declined  She is ready to manage from home  She is very appreciative of her ED care and is ready to manage from home  She understands return precautions    She will f/u w/ PCP tomorrow                                 SBIRT 20yo+      Flowsheet Row Most Recent Value   Initial Alcohol Screen: US AUDIT-C     1. How often do you have a drink containing alcohol? 0 Filed at: 04/09/2024 0507   2. How many drinks containing alcohol do you have on a typical day you are drinking?  0 Filed at: 04/09/2024 0507   3a. Male UNDER 65: How often do you have five or more drinks on one occasion? 0 Filed at: 04/09/2024 0507   3b. FEMALE Any Age, or MALE 65+: How often do you have 4 or more drinks on one occassion? 0 Filed at: 04/09/2024 0507   Audit-C Score 0 Filed at: 04/09/2024 0507   KELVIN: How many times in the past year have you...    Used an illegal drug or used a prescription medication for non-medical reasons? Never Filed at: 04/09/2024 0507                      Medical Decision Making  Patient with history as above presented with Patient presents with:  Abdominal Pain: Started 4 days ago. Pain is all over after eating any food, when the pain dies down it localizes to lower left abd. Nausea, diarrhea- but more constipation. No vomiting.     History obtained from  patient    Patient was nontoxic, stable. Ambulatory. Exam as above.      Labs reviewed.        Differential diagnosis considered. Overall presentation is consistent with abdominal pain, possible diverticulitis.   Low suspicion for surgical abdomen, life or limb threatening process    Patient was treated with IV Morphine with improvement in symptoms.    No Consideration was given for admission, as the patient was very stable for outpatient management.    Disposition: Discussed need to follow up with PCP  Discharged with instructions to obtain outpatient follow up of patient's symptoms and findings, with strict return precautions if patient develops new or worsening symptoms.      This medical documentation was created using an electronic medical record system with M Modal voice recognition.  Although this document has been carefully reviewed, there may still be some phonetic and typographical errors.  These errors are purely typographical and due to imperfections of the software program, do not reflect any compromise in the patient's medical care.        Amount and/or Complexity of Data Reviewed  Labs: ordered. Decision-making details documented in ED Course.  Radiology: ordered.    Risk  OTC drugs.  Prescription drug management.             Disposition  Final diagnoses:   None     ED Disposition       None          Follow-up Information    None         Patient's Medications   Discharge Prescriptions    No medications on file       No discharge procedures on file.    PDMP Review       None            ED Provider  Electronically Signed by             Tari Narayanan MD  04/11/24 4067

## 2024-06-11 DIAGNOSIS — Z00.6 ENCOUNTER FOR EXAMINATION FOR NORMAL COMPARISON OR CONTROL IN CLINICAL RESEARCH PROGRAM: ICD-10-CM

## 2024-10-20 ENCOUNTER — APPOINTMENT (EMERGENCY)
Dept: RADIOLOGY | Facility: HOSPITAL | Age: 34
End: 2024-10-20
Payer: COMMERCIAL

## 2024-10-20 ENCOUNTER — HOSPITAL ENCOUNTER (EMERGENCY)
Facility: HOSPITAL | Age: 34
Discharge: HOME | End: 2024-10-20
Attending: EMERGENCY MEDICINE
Payer: COMMERCIAL

## 2024-10-20 VITALS
RESPIRATION RATE: 18 BRPM | OXYGEN SATURATION: 96 % | WEIGHT: 241 LBS | DIASTOLIC BLOOD PRESSURE: 61 MMHG | SYSTOLIC BLOOD PRESSURE: 135 MMHG | BODY MASS INDEX: 28.46 KG/M2 | TEMPERATURE: 98.1 F | HEART RATE: 129 BPM | HEIGHT: 77 IN

## 2024-10-20 DIAGNOSIS — S86.012A ACHILLES TENDON RUPTURE, LEFT, INITIAL ENCOUNTER: Primary | ICD-10-CM

## 2024-10-20 PROCEDURE — 96372 THER/PROPH/DIAG INJ SC/IM: CPT

## 2024-10-20 PROCEDURE — 99283 EMERGENCY DEPT VISIT LOW MDM: CPT

## 2024-10-20 PROCEDURE — 99284 EMERGENCY DEPT VISIT MOD MDM: CPT | Performed by: EMERGENCY MEDICINE

## 2024-10-20 PROCEDURE — 73610 X-RAY EXAM OF ANKLE: CPT

## 2024-10-20 RX ORDER — TESTOSTERONE CYPIONATE 200 MG/ML
INJECTION, SOLUTION INTRAMUSCULAR
COMMUNITY
Start: 2024-07-24

## 2024-10-20 RX ORDER — ACETAMINOPHEN 325 MG/1
975 TABLET ORAL ONCE
Status: COMPLETED | OUTPATIENT
Start: 2024-10-20 | End: 2024-10-20

## 2024-10-20 RX ORDER — KETOROLAC TROMETHAMINE 30 MG/ML
15 INJECTION, SOLUTION INTRAMUSCULAR; INTRAVENOUS ONCE
Status: COMPLETED | OUTPATIENT
Start: 2024-10-20 | End: 2024-10-20

## 2024-10-20 RX ORDER — DEXTROAMPHETAMINE SACCHARATE, AMPHETAMINE ASPARTATE MONOHYDRATE, DEXTROAMPHETAMINE SULFATE AND AMPHETAMINE SULFATE 5; 5; 5; 5 MG/1; MG/1; MG/1; MG/1
20 CAPSULE, EXTENDED RELEASE ORAL
COMMUNITY
Start: 2024-06-12

## 2024-10-20 RX ORDER — OXYCODONE HYDROCHLORIDE 5 MG/1
5 TABLET ORAL EVERY 6 HOURS PRN
Qty: 10 TABLET | Refills: 0 | Status: SHIPPED | OUTPATIENT
Start: 2024-10-20 | End: 2024-10-24

## 2024-10-20 RX ORDER — ROSUVASTATIN CALCIUM 5 MG/1
5 TABLET, COATED ORAL DAILY
COMMUNITY
Start: 2024-08-18

## 2024-10-20 RX ADMIN — KETOROLAC TROMETHAMINE 15 MG: 30 INJECTION, SOLUTION INTRAMUSCULAR at 12:18

## 2024-10-20 RX ADMIN — ACETAMINOPHEN 975 MG: 325 TABLET ORAL at 12:15

## 2024-10-20 NOTE — ED PROVIDER NOTES
"Time reflects when diagnosis was documented in both MDM as applicable and the Disposition within this note       Time User Action Codes Description Comment    10/20/2024  1:09 PM Josiah Moreno [S86.012A] Achilles tendon rupture, left, initial encounter     10/20/2024  1:10 PM Josiah Moreno Add [E66.3] Overweight (BMI 25.0-29.9)     10/20/2024  3:17 PM Josiah Moreno Remove [E66.3] Overweight (BMI 25.0-29.9)           ED Disposition       ED Disposition   Discharge    Condition   Stable    Date/Time   Sun Oct 20, 2024  1:09 PM    Comment   Raghav Blair discharge to home/self care.                   Assessment & Plan       Medical Decision Making  34-year-old male presenting for evaluation of left lower leg injury.  Griggs a \"pop\" when starting to sprint.  Has tenderness and swelling in the left Achilles area.  Minimal plantarflexion on exam.  Leg otherwise neurovascular intact.  The symptoms are likely secondary to Achilles tear versus sprain.  Will obtain left ankle x-ray.  Will give Toradol and Tylenol  Given positive Wright test on exam, have concern for Achilles tendon rupture.  Patient was splinted in plantarflexion with a posterior splint.  Patient was given crutches.  Told not to weight-bear until cleared by orthopedic surgery.  A referral was placed orthopedic surgery.    Problems Addressed:  Achilles tendon rupture, left, initial encounter: acute illness or injury    Risk  OTC drugs.  Prescription drug management.             Medications   ketorolac (TORADOL) injection 15 mg (15 mg Intramuscular Given 10/20/24 1218)   acetaminophen (TYLENOL) tablet 975 mg (975 mg Oral Given 10/20/24 1215)       ED Risk Strat Scores                           SBIRT 22yo+      Flowsheet Row Most Recent Value   Initial Alcohol Screen: US AUDIT-C     1. How often do you have a drink containing alcohol? 0 Filed at: 10/20/2024 1222   2. How many drinks containing alcohol do you have on a typical day you are " drinking?  0 Filed at: 10/20/2024 1222   3a. Male UNDER 65: How often do you have five or more drinks on one occasion? 0 Filed at: 10/20/2024 1222   3b. FEMALE Any Age, or MALE 65+: How often do you have 4 or more drinks on one occassion? 0 Filed at: 10/20/2024 1222   Audit-C Score 0 Filed at: 10/20/2024 1222   KELVIN: How many times in the past year have you...    Used an illegal drug or used a prescription medication for non-medical reasons? Never Filed at: 10/20/2024 1222                            History of Present Illness       Chief Complaint   Patient presents with    Ankle Injury     Pt heard an audible pop in left lower leg  while sprinting during a soccer game. Pt unable to bear weight on left foot        Past Medical History:   Diagnosis Date    Allergic     Seasonal      No past surgical history on file.   Family History   Problem Relation Age of Onset    Alcohol abuse Maternal Grandmother     Arthritis Maternal Grandmother     Depression Maternal Grandmother     Drug abuse Maternal Grandmother     Mental illness Maternal Grandmother     Psychosis Maternal Grandmother     Arthritis Mother     Depression Mother     Hyperlipidemia Mother     Anxiety disorder Mother     Arthritis Paternal Grandmother     Depression Paternal Grandmother     Arthritis Maternal Grandfather     Cancer Maternal Aunt     Vision loss Maternal Aunt     Depression Father     Hyperlipidemia Father     Early death Paternal Grandfather     Psoriasis Daughter     Anxiety disorder Sister       Social History     Tobacco Use    Smoking status: Former     Current packs/day: 1.00     Average packs/day: 1 pack/day for 10.0 years (10.0 ttl pk-yrs)     Types: Cigarettes, Pipe, Cigars    Smokeless tobacco: Former   Substance Use Topics    Alcohol use: Yes     Alcohol/week: 10.0 standard drinks of alcohol     Types: 10 Standard drinks or equivalent per week     Comment: Sometimes, other times no drinks for weeks.    Drug use: Never     "  E-Cigarette/Vaping      E-Cigarette/Vaping Substances      I have reviewed and agree with the history as documented.     Patient is a 34-year-old male who presents for evaluation of a left lower leg injury.  Patient says that he was starting to sprint when he heard a \"pop\" in the left lower leg.  He says the pain radiated from the heel up into his calf.  He says he has not been able to put any weight on the leg.  He says all of his pain is located in the area of the Achilles.  Denies any ankle pain, foot pain.  Leg is neurovascularly intact.  He is able to dorsiflex the foot but has minimal plantarflexion.        Review of Systems   Constitutional:  Negative for chills, fever and unexpected weight change.   HENT:  Negative for congestion, sore throat and trouble swallowing.    Eyes:  Negative for pain, discharge and itching.   Respiratory:  Negative for cough, chest tightness, shortness of breath and wheezing.    Cardiovascular:  Negative for chest pain, palpitations and leg swelling.   Gastrointestinal:  Negative for abdominal pain, blood in stool, diarrhea, nausea and vomiting.   Endocrine: Negative for polyuria.   Genitourinary:  Negative for difficulty urinating, dysuria, frequency and hematuria.   Musculoskeletal:  Positive for arthralgias (left lower leg). Negative for back pain.   Neurological:  Negative for dizziness, syncope, weakness, light-headedness and headaches.           Objective       ED Triage Vitals [10/20/24 1157]   Temperature Pulse Blood Pressure Respirations SpO2 Patient Position - Orthostatic VS   98.1 °F (36.7 °C) (!) 129 135/61 18 96 % Lying      Temp Source Heart Rate Source BP Location FiO2 (%) Pain Score    Tympanic -- Right arm -- 6      Vitals      Date and Time Temp Pulse SpO2 Resp BP Pain Score FACES Pain Rating User   10/20/24 1157 98.1 °F (36.7 °C) 129 96 % 18 135/61 6 --             Physical Exam  Vitals and nursing note reviewed.   Constitutional:       General: He is not in " acute distress.     Appearance: He is well-developed.   HENT:      Head: Normocephalic and atraumatic.      Right Ear: External ear normal.      Left Ear: External ear normal.   Eyes:      Conjunctiva/sclera: Conjunctivae normal.      Pupils: Pupils are equal, round, and reactive to light.   Cardiovascular:      Rate and Rhythm: Normal rate and regular rhythm.      Heart sounds: Normal heart sounds. No murmur heard.     No friction rub. No gallop.   Pulmonary:      Effort: Pulmonary effort is normal. No respiratory distress.      Breath sounds: Normal breath sounds. No wheezing or rales.   Abdominal:      General: Bowel sounds are normal. There is no distension.      Palpations: Abdomen is soft.      Tenderness: There is no abdominal tenderness. There is no guarding.   Musculoskeletal:         General: Tenderness (left achilles) and signs of injury present. No deformity. Normal range of motion.      Cervical back: Normal range of motion.      Comments: Swelling/tenderness in distal calf  + Suárez test   Lymphadenopathy:      Cervical: No cervical adenopathy.   Skin:     General: Skin is warm and dry.   Neurological:      General: No focal deficit present.      Mental Status: He is alert and oriented to person, place, and time. Mental status is at baseline.      Cranial Nerves: No cranial nerve deficit.      Sensory: No sensory deficit.      Motor: No weakness or abnormal muscle tone.   Psychiatric:         Behavior: Behavior normal.         Results Reviewed       None            XR ankle 3+ views LEFT    (Results Pending)       Procedures    ED Medication and Procedure Management   Prior to Admission Medications   Prescriptions Last Dose Informant Patient Reported? Taking?   HYDROcodone-acetaminophen (Norco) 5-325 mg per tablet Not Taking  No No   Sig: Take 1 tablet by mouth every 6 (six) hours as needed for pain for up to 10 doses Max Daily Amount: 4 tablets   Patient not taking: Reported on 10/20/2024    amphetamine-dextroamphetamine (ADDERALL XR) 20 MG 24 hr capsule   Yes Yes   Sig: Take 20 mg by mouth   rosuvastatin (CRESTOR) 5 mg tablet   Yes Yes   Sig: Take 5 mg by mouth daily   testosterone cypionate (DEPO-TESTOSTERONE) 200 mg/mL SOLN   Yes Yes   Sig: INJECT UP TO 1ML INTRAMUSCULARLY OR SUBCUTANEOUSLY TWICE WEEKLY AS DIRECTED   valACYclovir (VALTREX) 1,000 mg tablet   No No   Sig: Take 1 tablet (1,000 mg total) by mouth 3 (three) times a day for 7 days      Facility-Administered Medications: None     Discharge Medication List as of 10/20/2024  1:11 PM        START taking these medications    Details   oxyCODONE (ROXICODONE) 5 immediate release tablet Take 1 tablet (5 mg total) by mouth every 6 (six) hours as needed for severe pain for up to 5 days Max Daily Amount: 20 mg, Starting Sun 10/20/2024, Until Fri 10/25/2024 at 2359, Normal           CONTINUE these medications which have NOT CHANGED    Details   amphetamine-dextroamphetamine (ADDERALL XR) 20 MG 24 hr capsule Take 20 mg by mouth, Starting Wed 6/12/2024, Historical Med      rosuvastatin (CRESTOR) 5 mg tablet Take 5 mg by mouth daily, Starting Sun 8/18/2024, Historical Med      testosterone cypionate (DEPO-TESTOSTERONE) 200 mg/mL SOLN INJECT UP TO 1ML INTRAMUSCULARLY OR SUBCUTANEOUSLY TWICE WEEKLY AS DIRECTED, Historical Med      HYDROcodone-acetaminophen (Norco) 5-325 mg per tablet Take 1 tablet by mouth every 6 (six) hours as needed for pain for up to 10 doses Max Daily Amount: 4 tablets, Starting Tue 4/9/2024, Normal      valACYclovir (VALTREX) 1,000 mg tablet Take 1 tablet (1,000 mg total) by mouth 3 (three) times a day for 7 days, Starting Mon 11/23/2020, Until Mon 11/30/2020, Normal             ED SEPSIS DOCUMENTATION   Time reflects when diagnosis was documented in both MDM as applicable and the Disposition within this note       Time User Action Codes Description Comment    10/20/2024  1:09 PM Josiah Moreno Add [S86.012A] Achilles tendon  rupture, left, initial encounter     10/20/2024  1:10 PM Josiah Moreno Add [E66.3] Overweight (BMI 25.0-29.9)     10/20/2024  3:17 PM Josiah Moreno Remove [E66.3] Overweight (BMI 25.0-29.9)                  Josiah Moreno DO  10/20/24 1518

## 2024-10-20 NOTE — DISCHARGE INSTRUCTIONS
It is important that you do not weight-bear on your left lower leg until cleared by orthopedic surgery.  Is important that you follow-up with them soon as possible.  Continue to take Tylenol Motrin as needed for your pain.  You can apply ice to the area as well

## 2024-10-23 ENCOUNTER — ANESTHESIA EVENT (OUTPATIENT)
Dept: PERIOP | Facility: AMBULARY SURGERY CENTER | Age: 34
End: 2024-10-23
Payer: COMMERCIAL

## 2024-10-23 ENCOUNTER — OFFICE VISIT (OUTPATIENT)
Dept: OBGYN CLINIC | Facility: CLINIC | Age: 34
End: 2024-10-23
Payer: COMMERCIAL

## 2024-10-23 VITALS
WEIGHT: 240 LBS | BODY MASS INDEX: 29.22 KG/M2 | DIASTOLIC BLOOD PRESSURE: 90 MMHG | HEART RATE: 80 BPM | HEIGHT: 76 IN | SYSTOLIC BLOOD PRESSURE: 154 MMHG

## 2024-10-23 DIAGNOSIS — S86.012A ACHILLES TENDON RUPTURE, LEFT, INITIAL ENCOUNTER: Primary | ICD-10-CM

## 2024-10-23 PROCEDURE — 99204 OFFICE O/P NEW MOD 45 MIN: CPT | Performed by: ORTHOPAEDIC SURGERY

## 2024-10-23 RX ORDER — CHLORHEXIDINE GLUCONATE 40 MG/ML
SOLUTION TOPICAL DAILY PRN
Status: CANCELLED | OUTPATIENT
Start: 2024-10-23

## 2024-10-23 RX ORDER — CHLORHEXIDINE GLUCONATE ORAL RINSE 1.2 MG/ML
15 SOLUTION DENTAL ONCE
Status: CANCELLED | OUTPATIENT
Start: 2024-10-23 | End: 2024-10-23

## 2024-10-23 NOTE — PATIENT INSTRUCTIONS
James R Lachman, M.D.  Attending, Orthopaedic Surgery  [facility]  Rajan Office Phone: [phone] ? Fax: [phone]  Lisa Office Phone: [phone] ? Fax:[phone]    : Gemma Boucher MA     Surgery Coordinators Rajan: Mary Lou Melendez, [phone]  Jennyfer Leal, [phone]  Surgery Coordinator Lisa:  Litacharles Herlinda, [phone]                                                       Suzie Singh, [phone]                                                            [email or web address]   PRE-OPERATIVE AND POST-OPERATIVE INSTRUCTIONS    General Information:  Your surgery is with Dr. Lachman.  Dates can change (although rare) depending on emergencies.  Typical post operative visits are at the following intervals:  3 weeks post surgery(except 1 week for bunions and wound monitoring), 6 weeks post surgery, 3 months post surgery, 6 months post surgery, and then on a yearly basis.  However, this may change based on Dr. Lachmans’ recommendation.  #1 post-operative rule for foot/ankle surgery:  ONCE YOU ARE OUT OF YOUR CAST AND/OR REMOVABLE BOOT, SWELLING MAY PERSIST FOR MANY MONTHS.  YOU MIGHT ALSO EXPERIENCE A BLUISH DISCOLORATION OF YOUR LEG.  THIS IS NORMAL AND PART OF THE USUAL POSTOPERATIVE EXPERIENCE.    SMOKING:  Smoking results in incomplete healing of fractures (broken bones) and joints that my have been fused.  Smoking and nicotine also prevents the growth of bone into ankle replacements and bone healing.  It also slows the healing of muscles and skin (soft tissue).  Therefore, please do not have surgery if you continue to smoke.  We reserve the right to cancel your surgery if we suspect that you are smoking.  DO NOT use nicorette gum or other patches.  Please find an alternative method to quit smoking before your surgery.    Pre-Operative Information:  Surgery date and preoperative visits:  If you have  medical problems, such as an abnormal EKG, history of BLOOD CLOT, ANEURYSM, and any other heart condition, please inform us so that we can get your medical clearance several weeks before the surgery.  Please bring any important medical information, such as an EKG, chest x-ray, or echocardiogram, with you to ensure that your surgery will not be delayed.  If needed, you will receive your preoperative appointments in the mail or by phone from our scheduling office.  The location of the preoperative appointment will be given to you also.  You may not eat after midnight the night before surgery.  If you do, your surgery will be cancelled.   You will receive a phone call from your surgery center the day before your surgery (if your surgery is on a Monday, you will get a call the Friday before).   If you do not hear from someone by 4pm the day before your surgery, please call the Surgical coordinator (number above) to notify us.  Start taking Vitamin D3 4000 units per day and Calcium 1200mg per day immediately. You will continue this until your 3 month post-op visit. These are over the counter and available at all pharmacies and supermarkets.  FOR THOSE HAVING SURGERY AT [facility]- IF YOU WILL NEED CRUTCHES OR A ROLLING WALKER AFTER SURGERY, ASK FOR A PRESCRIPTION FOR THIS FROM OUR OFFICE TODAY.  THIS CANNOT BE HANDLED THE DAY OF SURGERY AS [facility] DOES NOT STOCK THESE.    Because bacterial can often enter any defect in the skin, it is important to avoid any cuts before surgery.  Any breaks in the skin on the leg will often result in your surgery being postponed.  Please avoid going on a very long walk the day prior to surgery, or doing other activities that could lead to irritation of the skin, including yard work, extra athletic activity, or shaving.   This could result in surgery cancellation.  You MUST be fasting the day of your surgery.  Therefore, please do not consume any foot or beverage  after midnight the night before surgery.  The morning of surgery you may take your usual medications with a sip of water.  It is important not to take anti-inflammatory medication like Ibuprofen, Motrin, Naproxen (Aleve), or Aspirin 7-10 days before surgery because they will make you bleed more than usual.  Vitamin, E, Plavix and Coumadin also have the same effect.  Stop Aspirin and Vitamin E two weeks before surgery.  YOUR MEDICAL DOCTOR SHOULD TELL YOU WHEN TO STOP COUMADIN OR PLAVIX.  If your surgery involves any bone healing, please do not take anti-inflammatories for at least 6 weeks after surgery.  This can impede bone healing (ibuprofen, Aleve, Relafen, iodine).  Tylenol is fine to take.    PREOPERATIVE BATHING INSTRUCTIONS:    Before your surgery, bathe with Hibiclens (4% Chlorhexidene) as instructed below.  This skin cleanser will help reduce the bacteria on your skin before surgery.  To avoid irritating your eyes, do not apply Hibiclens above the level of your neck.  On the evening before AND the morning of surgery, bathe your entire body except the face and scalp, then rinse freely.  DO NOT apply to your face or scalp, as Hibiclens can irritate your eyes.  Purchasing information:   Hibiclens is available without a prescription at most retail pharmacies.     ADDITIONAL INSTRUCTIONS:  PATIENTS HAVING FOOT/ANKLE SURGERY     In preparation for your upcoming surgery, we kindly request and advise the following:  Notify our office if you are taking any of the following:  Coumadin (warfarin):  Persantine (dipyridamole); Pletal (cilostazol); Plavix (clopidogrel); Ticlid (ticlopidine); Agrylin (anagrelide); Aggrenox (dipyridamole and aspirin) or other blood thinners,.  In addition, stop taking Vitamin E and herbal supplements.  Do not schedule any elective dental work for at least 6 months after surgery.  If you had an ankle replacement, you will need to take antibiotics before any future dental procedures. Your  dentist or our office can prescribe these for you.  1000mg of Amoxicillin 1 hour prior to any dental procedure is the recommended dosing.      THREE RULES:    After surgery you will most likely be given the instructions “KEEP YOUR TOES ABOVE YOUR NOSE.”  This means that you MUST have your feet elevated higher than your heart.  Keeping your toes above your nose helps to heal the muscles and skin (soft tissues) by reducing swelling in your leg.  This position also helps to prevent infection, and is very important in avoiding deep venous thrombosis (blood clots).    In order to keep the blood circulating in your legs and in order to avoid deep vein   thrombosis (blood clots), we ask patients to GET UP ONCE AN HOUR during the day.  This means you should at least cross the room and come back.  It does not mean you have to be up for long periods of time.  In most cases we will not have people immediately put any weight on their operated part.  This is important to prevent loosening of metal or other devices holding the bones together.  It also prevents irritation of the soft tissues which can lead to prolonged healing.  When we say get up once an hour, please walk, hop or move with an assisted device.  This is important!    Do not do any excessive walking during the first few days after surgery.  Recovering from surgery is a full-time task for the patient.  Postoperative care is important to avoid irritating the skin incision, which can lead to infection.  Please do not plan activities or go out of town for several weeks after surgery.  If you are unsure about your future activities, please schedule surgery only when you know it is acceptable for you.  Scheduling surgery and then canceling the date, prevents other people from having surgery on that date as it takes time to line everything up effectively.  If you cancel your surgery the week of your planned surgery, we reserve the right to cancel all future surgical  procedures.    THE DAY OF SURGERY:    Arrival to the hospital or outpatient surgical center on time is imperative.  If you arrive late, then your surgery will be cancelled.  You MUST have a family member/friend bring you, stay with you throughout the DURATION of your surgery, and drive you home.  You MUST be fasting the day of your surgery.  Therefore, do not consume any food or beverage after midnight the night before surgery.  At your pre-operative visit with the anesthesia staff, or during your phone screen, a nurse will instruct you what medications you will need to take the day of surgery.  MAKE SURE THAT THE PHARMACY LISTED IN THE ELECTRONIC MEDICAL RECORD (EPIC) IS YOUR PREFERRED PHARMACY. For example, if you are staying with family or a friend, and will not be near your preferred pharmacy, YOU MUST, tell the nurses checking you in the day of surgery so that this can be changed in the system.  If your prescriptions are sent to a pharmacy, this cannot be changed.      AFTER YOUR SURGERY:  Bleeding through the bandage almost always occurs.  Do not let this alarm you.  Simply add more gauze or a towel, call us, and come in for a dressing change.   If you think it is excessive, contact us immediately or go to the local emergency room.    Do not get the bandage wet.  Showering is possible with plastic protectors.   Be very careful, as the bathroom can be wet and slippery.  If you do get your dressing wet, it should be changed immediately.  Please contact us.      ONCE YOUR ARE OUT OF YOUR CAST AND/OR REMOVABLE BOOT, SWELLING MAY PERSIST FOR MANY MONTHS.  YOU MIGHT ALSO EXPERIENCE A BLUISH DISCOLORATION OF YOUR LEG.  THIS IS NORMAL AND PART OF THE USUAL POSTOPERATIVE EXPERIENCE.  WEARING COMPRESSION HOSE (ELASTIC STOCKINGS) CAN HELP AVOID SOME OF THIS SWELLING.      DRESSING:   The purpose of the surgical dressing is to keep your wound and the surgical site protected from the environment.  Most dressings contain  splints, which help to hold your foot and ankle in a corrected position, and also allow the surgical site to heal properly. Dressings will remain in place and undisturbed until the first postop visit.   If you have a drain in place, this will need to be removed in 1-3 days after surgery.  The time for the drain to be pulled will be written on your discharge instruction sheet.    CAST  INSTRUCTIONS:  You may or may not get a cast following surgery.  If you do, pay close attention to the following:    After application of a splint or cast, it is very important to elevate your leg for 24 to 72 hours.   The injured area should be elevated well above the heart.   Remember “Toes above your Nose”.  Rest and elevation greatly reduce pain and speed the healing process by minimizing early swelling.    CALL YOUR DOCTORS OFFICE OR VISIT LOCATION EMERGENCY ROOM IF YOU HAVE ANY OF THE FOLLOWING:    Significant increased pain, which may be caused by swelling, and the feeling that the splint or cast is too tight  Numbness and tingling in your hand or foot, which may be caused by too much pressure on the nerves  Burning and stinging, which may be caused by too much pressure on the skin  Excessive swelling below the cast, which may mean the cast is slowing your blood circulation  Loss of active movement of toes, which request an urgent evaluation  Loss of “capillary refill”.  Pinch the tip of toes and armin the skin.  Release pressure and if the skin does not return pink then call the office immediately.      DO NOT GET YOUR CAST WET.   Bacteria thrive in moist dark areas.  We do not want this.   If your cast becomes wet, return to the office and we will apply another one.    PAIN AFTER SURGERY:  Narcotic pain medication can and will depress your respiratory system if taken in excess.  The goal of pain management with narcotics is to be comfortable not pain free.  If you take enough narcotics to be pain free then you run the risk of  stopping breathing.  If this happens, call 911 immediately!  Pain in the heel is often  caused by pressure from the weight of your foot on the bed.  Make sure your heel is suspended off the bed by keeping a pillow underneath your calf not your knee.    Medications:  You will be given narcotic pain medication. Do NOT drive while taking narcotic medications. Medications such as Darvocet, Percocet, Vicoden or Tylenol #3, also contain acetaminophen (Tylenol). Do not take acetaminophen or Tylenol from home when taking theses medications. When you fill your prescription, you may ask the pharmacist if your pain medication has acetaminophen/Tylenol in it. It is okay to take Tylenol with Oxycontin/Oxycodone. Should you have pain after taking your prescription medication, ibuprophen (Motrin, Advil, and Alleve) is a common over the counter preparation and may often be taken with the prescription pain medication as long as you take them with food. These medications can irritate the stomach lining.   Unless you are allergic to aspirin or currently taking a blood thinner, Dr. Lachmans’ patients are requested to take one 81 mg aspirin every 12 hours until you are back to walking normally after surgery (This can be up to 6 weeks).  Narcotic medications commonly cause nausea. Taking them with food will decrease this side effect. If you are having extreme nausea, please contact us for an alternative medication or for something that can be taken with this medication to decrease the nausea.   Also, narcotic medications frequently cause constipation. An increase of fiber, fruits and vegetables in your diet may alleviate this problem, or if necessary, you may use an over-the-counter medication such as senekot, colace, or Fibercon for constipation problems.   You should resume all medications you were taking prior to the surgery unless otherwise specified.     Activity:   Because of your recent foot surgery, your activity level will  decrease. You will need to elevate your foot ABOVE the level of your heart for a minimum of four days. The length of time necessary for the swelling to go down, and for your wounds to heal properly depends greatly on your efforts here. Elevation is extremely important to avoid compromising the blood supply to your foot. Remember when your foot is down it will swell, which will increase pain and slow healing. Wiggle your toes frequently if possible.   If you go home with a regional block, (a type of anesthesia) the foot and leg will be numb. Think of ways to get into your house and around the house until the block wears off.   Keep in mind that it may be a legal issue if you drive while in a cast or splint, especially when the splint is on the right foot. You may call the Department of [employer] to schedule a road test if you have adaptive equipment applied to your car.   The amount of weight you are allowed to bear on your foot will be written on your discharge sheet filled out at the time of surgery. The following is an explanation of the possibilities:       COVID-19 Policies  [facility] has the following policies when it comes to ELECTIVE surgery  No elective surgery requiring anesthesia until 7 weeks after a patient tested positive for COVID-19   No elective surgery requiring anesthesia until 3 months after a patient was hospitalized for COVID-19      Non-weight bearing:   You are to put NO weight whatsoever on your foot. When using crutches or a walker, your foot should not touch the ground, except when you are standing. Then, it may rest on the ground. If you are to be non-weight bearing, and you are not compliant, you could compromise the surgery.     Some of our patients have been requesting prescriptions for a roll-a-bout knee scooter. [employer] and other insurances have been denying these claims, and you may either have to rent one or pay out of pocket to purchase one.  THIS SHOULD BE  PURCHASED PRIOR TO THE SURGERY AND YOU SHOULD BRING IT WITH YOU THE DAY OF THE SURGERY TO AIDE IN GETTING FROM THE CAR INTO THE HOUSE AFTER SURGERY.

## 2024-10-23 NOTE — PROGRESS NOTES
James R Lachman, M.D.  Attending, Orthopaedic Surgery  Foot and Ankle  [facility]      ORTHOPAEDIC FOOT AND ANKLE CLINIC VISIT     Assessment:     Encounter Diagnosis   Name Primary?    Achilles tendon rupture, left, initial encounter Yes        Plan:   The patient verbalized understanding of exam findings and treatment plan. We engaged in the shared decision-making process and treatment options were discussed at length with the patient. Surgical and conservative management discussed today along with risks and benefits.  Patient has ruptured his LEFT achilles tendon on 10/19/24. At his young age and activity level this will require a surgical procedure to have him return to normal activities.   Placed in CAM boot today  He understood and informed consent was obtained today for a LEFT achilles tendon repair  Return for 3 week post operative visit.    CONSENT FOR SOFT TISSUE PROCEDURES:   Patient understands that there is no guarantee that the surgery will relieve all of their pain and also understands that there may be a prolonged course of protected weight-bearing status required which will restrict them from driving and other activities as discussed at today's visit. Patient recognizes that there are risks with surgery including bleeding, numbness, nerve irritation, wound complications, infection, continued pain, anesthetic complications, death, failure of procedure and possible need for further surgery. The patient understands that there is no guarantee that this surgery will relieve all of His pain and symptoms.  Patient understands that there is no guarantee that they will return to full function after the procedure.  Patient has provided informed consent for the procedure.       History of Present Illness:   Chief Complaint:   Chief Complaint   Patient presents with    Left Ankle - Pain     On Sunday he ruptured his achilles. Was playing soccer. [place] and felt a pop. In splint and  crutches.      Raghav Blair is a 34 y.o. male who is being seen for left ankle pain. Patient was injured on 10/19/24 while playing soccer.  Pain is localized at achilles tendon with minimal radiating and described as sharp and severe. Patient denies numbness, tingling or radicular pain.  Denies history of neuropathy.  Patient does not smoke, does not have diabetes and does not take blood thinners.  Patient denies family history of anesthesia complications and has not had any complications with anesthesia.     Pain/symptom timing:  Worse during the day when active  Pain/symptom context:  Worse with activites and work  Pain/symptom modifying factors:  Rest makes better, activities make worse  Pain/symptom associated signs/symptoms: none    Prior treatment   NSAIDsYes   Injections No   Bracing/Orthotics No    Physical Therapy No     Orthopedic Surgical History:   See below    Past Medical, Surgical and Social History:  Past Medical History:  has a past medical history of Allergic.  Problem List: does not have any pertinent problems on file.  Past Surgical History:  has no past surgical history on file.  Family History: family history includes Alcohol abuse in his maternal grandmother; Anxiety disorder in his mother and sister; Arthritis in his maternal grandfather, maternal grandmother, mother, and paternal grandmother; Cancer in his maternal aunt; Depression in his father, maternal grandmother, mother, and paternal grandmother; Drug abuse in his maternal grandmother; Early death in his paternal grandfather; Hyperlipidemia in his father and mother; Mental illness in his maternal grandmother; Psoriasis in his daughter; Psychosis in his maternal grandmother; Vision loss in his maternal aunt.  Social History:  reports that he has quit smoking. His smoking use included cigarettes, pipe, and cigars. He has a 10 pack-year smoking history. He has quit using smokeless tobacco. He reports current alcohol use of about  "10.0 standard drinks of alcohol per week. He reports that he does not use drugs.  Current Medications: has a current medication list which includes the following prescription(s): amphetamine-dextroamphetamine, oxycodone, rosuvastatin, testosterone cypionate, hydrocodone-acetaminophen, and valacyclovir.  Allergies: has no active allergies.     Review of Systems:  General- denies fever/chills  HEENT- denies hearing loss or sore throat  Eyes- denies eye pain or visual disturbances, denies red eyes  Respiratory- denies cough or SOB  Cardio- denies chest pain or palpitations  GI- denies abdominal pain  Endocrine- denies urinary frequency  Urinary- denies pain with urination  Musculoskeletal- Negative except noted above  Skin- denies rashes or wounds  Neurological- denies dizziness or headache  Psychiatric- denies anxiety or difficulty concentrating    Physical Exam:   /90   Pulse 80   Ht 6' 4\" (1.93 m)   Wt 109 kg (240 lb)   BMI 29.21 kg/m²   General/Constitutional: No apparent distress: well-nourished and well developed.  Eyes: normal ocular motion  Cardio: RRR, Normal S1S2, No m/r/g  Lymphatic: No appreciable lymphadenopathy  Respiratory: Non-labored breathing, CTA b/l no w/c/r  Vascular: No edema, swelling or tenderness, except as noted in detailed exam.  Integumentary: No impressive skin lesions present, except as noted in detailed exam.  Neuro: No ataxia or tremors noted  Psych: Normal mood and affect, oriented to person, place and time. Appropriate affect.  Musculoskeletal: Normal, except as noted in detailed exam and in HPI.    Examination    Left    Gait Non weight bearing in splint   Musculoskeletal Tender to palpation at achilles tendon with palpable defect    Skin Normal.      Nails Normal    Range of Motion  20 degrees dorsiflexion, Full passive degrees plantarflexion  Subtalar motion: normal    Stability Stable    Muscle Strength 5/5 tibialis anterior  0/5 gastrocnemius-soleus  5/5 posterior " tibialis  5/5 peroneal/eversion strength  5/5 EHL  5/5 FHL    Neurologic Normal    Sensation Intact to light touch throughout sural, saphenous, superficial peroneal, deep peroneal and medial/lateral plantar nerve distributions.  [place] 5.07 filament (10g) testing deferred.    Cardiovascular Brisk capillary refill < 2 seconds,intact DP and PT pulses    Special Tests Positive Wright's Test  Loss of resting equinus       Imaging Studies:   3 views of the left ankle were taken, reviewed and interpreted independently that demonstrate no acute osseous abnormalities or degenerative changes. Reviewed by me personally.        James R. Lachman, MD  Foot & Ankle Surgery   Department of Orthopaedic Surgery  [facility]      I personally performed the service.    James R. Lachman, MD    Scribe Attestation      I,:  Huy Urbina am acting as a scribe while in the presence of the attending physician.:       I,:  James R Lachman, MD personally performed the services described in this documentation    as scribed in my presence.:

## 2024-10-23 NOTE — ANESTHESIA PREPROCEDURE EVALUATION
Procedure:  REPAIR TENDON ACHILLES (Left: Foot)    Relevant Problems   PULMONARY   (-) Smoking   (-) URI (upper respiratory infection)        Physical Exam    Airway    Mallampati score: II  TM Distance: >3 FB  Neck ROM: full     Dental   No notable dental hx     Cardiovascular      Pulmonary      Other Findings        Anesthesia Plan  ASA Score- 2     Anesthesia Type- general with ASA Monitors.         Additional Monitors:     Airway Plan: ETT.    Comment: Adductor Canal and Popliteal Blocks planned..       Plan Factors-Exercise tolerance (METS): >4 METS.    Chart reviewed.    Patient summary reviewed.    Patient is not a current smoker.              Induction- intravenous.    Postoperative Plan-         Informed Consent- Anesthetic plan and risks discussed with patient and spouse.  I personally reviewed this patient with the CRNA. Discussed and agreed on the Anesthesia Plan with the CRNA..    Discussed with patient the Popliteal and Adductor Canal Blocks. Explained possible side effects including incomplete block and possible prolonged effect of blocks. All questions answered. Consent given

## 2024-10-23 NOTE — H&P (VIEW-ONLY)
James R Lachman, M.D.  Attending, Orthopaedic Surgery  Foot and Ankle  [facility]      ORTHOPAEDIC FOOT AND ANKLE CLINIC VISIT     Assessment:     Encounter Diagnosis   Name Primary?    Achilles tendon rupture, left, initial encounter Yes        Plan:   The patient verbalized understanding of exam findings and treatment plan. We engaged in the shared decision-making process and treatment options were discussed at length with the patient. Surgical and conservative management discussed today along with risks and benefits.  Patient has ruptured his LEFT achilles tendon on 10/19/24. At his young age and activity level this will require a surgical procedure to have him return to normal activities.   Placed in CAM boot today  He understood and informed consent was obtained today for a LEFT achilles tendon repair  Return for 3 week post operative visit.    CONSENT FOR SOFT TISSUE PROCEDURES:   Patient understands that there is no guarantee that the surgery will relieve all of their pain and also understands that there may be a prolonged course of protected weight-bearing status required which will restrict them from driving and other activities as discussed at today's visit. Patient recognizes that there are risks with surgery including bleeding, numbness, nerve irritation, wound complications, infection, continued pain, anesthetic complications, death, failure of procedure and possible need for further surgery. The patient understands that there is no guarantee that this surgery will relieve all of His pain and symptoms.  Patient understands that there is no guarantee that they will return to full function after the procedure.  Patient has provided informed consent for the procedure.       History of Present Illness:   Chief Complaint:   Chief Complaint   Patient presents with    Left Ankle - Pain     On Sunday he ruptured his achilles. Was playing soccer. [place] and felt a pop. In splint and  crutches.      Raghav Blair is a 34 y.o. male who is being seen for left ankle pain. Patient was injured on 10/19/24 while playing soccer.  Pain is localized at achilles tendon with minimal radiating and described as sharp and severe. Patient denies numbness, tingling or radicular pain.  Denies history of neuropathy.  Patient does not smoke, does not have diabetes and does not take blood thinners.  Patient denies family history of anesthesia complications and has not had any complications with anesthesia.     Pain/symptom timing:  Worse during the day when active  Pain/symptom context:  Worse with activites and work  Pain/symptom modifying factors:  Rest makes better, activities make worse  Pain/symptom associated signs/symptoms: none    Prior treatment   NSAIDsYes   Injections No   Bracing/Orthotics No    Physical Therapy No     Orthopedic Surgical History:   See below    Past Medical, Surgical and Social History:  Past Medical History:  has a past medical history of Allergic.  Problem List: does not have any pertinent problems on file.  Past Surgical History:  has no past surgical history on file.  Family History: family history includes Alcohol abuse in his maternal grandmother; Anxiety disorder in his mother and sister; Arthritis in his maternal grandfather, maternal grandmother, mother, and paternal grandmother; Cancer in his maternal aunt; Depression in his father, maternal grandmother, mother, and paternal grandmother; Drug abuse in his maternal grandmother; Early death in his paternal grandfather; Hyperlipidemia in his father and mother; Mental illness in his maternal grandmother; Psoriasis in his daughter; Psychosis in his maternal grandmother; Vision loss in his maternal aunt.  Social History:  reports that he has quit smoking. His smoking use included cigarettes, pipe, and cigars. He has a 10 pack-year smoking history. He has quit using smokeless tobacco. He reports current alcohol use of about  "10.0 standard drinks of alcohol per week. He reports that he does not use drugs.  Current Medications: has a current medication list which includes the following prescription(s): amphetamine-dextroamphetamine, oxycodone, rosuvastatin, testosterone cypionate, hydrocodone-acetaminophen, and valacyclovir.  Allergies: has no active allergies.     Review of Systems:  General- denies fever/chills  HEENT- denies hearing loss or sore throat  Eyes- denies eye pain or visual disturbances, denies red eyes  Respiratory- denies cough or SOB  Cardio- denies chest pain or palpitations  GI- denies abdominal pain  Endocrine- denies urinary frequency  Urinary- denies pain with urination  Musculoskeletal- Negative except noted above  Skin- denies rashes or wounds  Neurological- denies dizziness or headache  Psychiatric- denies anxiety or difficulty concentrating    Physical Exam:   /90   Pulse 80   Ht 6' 4\" (1.93 m)   Wt 109 kg (240 lb)   BMI 29.21 kg/m²   General/Constitutional: No apparent distress: well-nourished and well developed.  Eyes: normal ocular motion  Cardio: RRR, Normal S1S2, No m/r/g  Lymphatic: No appreciable lymphadenopathy  Respiratory: Non-labored breathing, CTA b/l no w/c/r  Vascular: No edema, swelling or tenderness, except as noted in detailed exam.  Integumentary: No impressive skin lesions present, except as noted in detailed exam.  Neuro: No ataxia or tremors noted  Psych: Normal mood and affect, oriented to person, place and time. Appropriate affect.  Musculoskeletal: Normal, except as noted in detailed exam and in HPI.    Examination    Left    Gait Non weight bearing in splint   Musculoskeletal Tender to palpation at achilles tendon with palpable defect    Skin Normal.      Nails Normal    Range of Motion  20 degrees dorsiflexion, Full passive degrees plantarflexion  Subtalar motion: normal    Stability Stable    Muscle Strength 5/5 tibialis anterior  0/5 gastrocnemius-soleus  5/5 posterior " tibialis  5/5 peroneal/eversion strength  5/5 EHL  5/5 FHL    Neurologic Normal    Sensation Intact to light touch throughout sural, saphenous, superficial peroneal, deep peroneal and medial/lateral plantar nerve distributions.  Gazelle-Maryann 5.07 filament (10g) testing deferred.    Cardiovascular Brisk capillary refill < 2 seconds,intact DP and PT pulses    Special Tests Positive Wright's Test  Loss of resting equinus       Imaging Studies:   3 views of the left ankle were taken, reviewed and interpreted independently that demonstrate no acute osseous abnormalities or degenerative changes. Reviewed by me personally.        James R. Lachman, MD  Foot & Ankle Surgery   Department of Orthopaedic Surgery  [facility]      I personally performed the service.    James R. Lachman, MD    Scribe Attestation      I,:  Huy Urbina am acting as a scribe while in the presence of the attending physician.:       I,:  James R Lachman, MD personally performed the services described in this documentation    as scribed in my presence.:

## 2024-10-24 ENCOUNTER — ANESTHESIA (OUTPATIENT)
Dept: PERIOP | Facility: AMBULARY SURGERY CENTER | Age: 34
End: 2024-10-24
Payer: COMMERCIAL

## 2024-10-24 ENCOUNTER — HOSPITAL ENCOUNTER (OUTPATIENT)
Facility: AMBULARY SURGERY CENTER | Age: 34
Setting detail: OUTPATIENT SURGERY
Discharge: HOME | End: 2024-10-24
Attending: ORTHOPAEDIC SURGERY | Admitting: ORTHOPAEDIC SURGERY
Payer: COMMERCIAL

## 2024-10-24 VITALS
HEIGHT: 76 IN | OXYGEN SATURATION: 96 % | BODY MASS INDEX: 29.22 KG/M2 | DIASTOLIC BLOOD PRESSURE: 83 MMHG | TEMPERATURE: 98 F | RESPIRATION RATE: 20 BRPM | SYSTOLIC BLOOD PRESSURE: 156 MMHG | HEART RATE: 92 BPM | WEIGHT: 240 LBS

## 2024-10-24 DIAGNOSIS — S86.012A ACHILLES TENDON RUPTURE, LEFT, INITIAL ENCOUNTER: Primary | ICD-10-CM

## 2024-10-24 PROCEDURE — C1713 ANCHOR/SCREW BN/BN,TIS/BN: HCPCS | Performed by: ORTHOPAEDIC SURGERY

## 2024-10-24 PROCEDURE — C9290 INJ, BUPIVACAINE LIPOSOME: HCPCS | Performed by: ANESTHESIOLOGY

## 2024-10-24 PROCEDURE — 27650 REPAIR ACHILLES TENDON: CPT | Performed by: ORTHOPAEDIC SURGERY

## 2024-10-24 DEVICE — PARS SUTURE IMPLANT KIT W/ SUTURETAPE
Type: IMPLANTABLE DEVICE | Site: FOOT | Status: FUNCTIONAL
Brand: ARTHREX®

## 2024-10-24 RX ORDER — MIDAZOLAM HYDROCHLORIDE 2 MG/2ML
INJECTION, SOLUTION INTRAMUSCULAR; INTRAVENOUS AS NEEDED
Status: DISCONTINUED | OUTPATIENT
Start: 2024-10-24 | End: 2024-10-24

## 2024-10-24 RX ORDER — OXYCODONE HYDROCHLORIDE 5 MG/1
5 TABLET ORAL EVERY 4 HOURS PRN
Qty: 30 TABLET | Refills: 0 | Status: SHIPPED | OUTPATIENT
Start: 2024-10-24 | End: 2024-10-24

## 2024-10-24 RX ORDER — FENTANYL CITRATE 50 UG/ML
INJECTION, SOLUTION INTRAMUSCULAR; INTRAVENOUS AS NEEDED
Status: DISCONTINUED | OUTPATIENT
Start: 2024-10-24 | End: 2024-10-24

## 2024-10-24 RX ORDER — PROPOFOL 10 MG/ML
INJECTION, EMULSION INTRAVENOUS AS NEEDED
Status: DISCONTINUED | OUTPATIENT
Start: 2024-10-24 | End: 2024-10-24

## 2024-10-24 RX ORDER — LIDOCAINE HYDROCHLORIDE 20 MG/ML
INJECTION, SOLUTION EPIDURAL; INFILTRATION; INTRACAUDAL; PERINEURAL AS NEEDED
Status: DISCONTINUED | OUTPATIENT
Start: 2024-10-24 | End: 2024-10-24

## 2024-10-24 RX ORDER — CEFAZOLIN SODIUM 2 G/50ML
2000 SOLUTION INTRAVENOUS ONCE
Status: COMPLETED | OUTPATIENT
Start: 2024-10-24 | End: 2024-10-24

## 2024-10-24 RX ORDER — BUPIVACAINE HYDROCHLORIDE 5 MG/ML
INJECTION, SOLUTION EPIDURAL; INTRACAUDAL AS NEEDED
Status: DISCONTINUED | OUTPATIENT
Start: 2024-10-24 | End: 2024-10-24

## 2024-10-24 RX ORDER — PROMETHAZINE HYDROCHLORIDE 25 MG/ML
25 INJECTION, SOLUTION INTRAMUSCULAR; INTRAVENOUS ONCE AS NEEDED
Status: DISCONTINUED | OUTPATIENT
Start: 2024-10-24 | End: 2024-10-24 | Stop reason: HOSPADM

## 2024-10-24 RX ORDER — CHLORHEXIDINE GLUCONATE 40 MG/ML
SOLUTION TOPICAL DAILY PRN
Status: DISCONTINUED | OUTPATIENT
Start: 2024-10-24 | End: 2024-10-24 | Stop reason: HOSPADM

## 2024-10-24 RX ORDER — ONDANSETRON 2 MG/ML
INJECTION INTRAMUSCULAR; INTRAVENOUS AS NEEDED
Status: DISCONTINUED | OUTPATIENT
Start: 2024-10-24 | End: 2024-10-24

## 2024-10-24 RX ORDER — ONDANSETRON 2 MG/ML
4 INJECTION INTRAMUSCULAR; INTRAVENOUS ONCE AS NEEDED
Status: DISCONTINUED | OUTPATIENT
Start: 2024-10-24 | End: 2024-10-24 | Stop reason: HOSPADM

## 2024-10-24 RX ORDER — FENTANYL CITRATE/PF 50 MCG/ML
50 SYRINGE (ML) INJECTION
Status: COMPLETED | OUTPATIENT
Start: 2024-10-24 | End: 2024-10-24

## 2024-10-24 RX ORDER — DEXAMETHASONE SODIUM PHOSPHATE 10 MG/ML
INJECTION, SOLUTION INTRAMUSCULAR; INTRAVENOUS AS NEEDED
Status: DISCONTINUED | OUTPATIENT
Start: 2024-10-24 | End: 2024-10-24

## 2024-10-24 RX ORDER — ONDANSETRON 4 MG/1
4 TABLET, FILM COATED ORAL EVERY 8 HOURS PRN
Qty: 10 TABLET | Refills: 0 | Status: SHIPPED | OUTPATIENT
Start: 2024-10-24

## 2024-10-24 RX ORDER — LIDOCAINE HYDROCHLORIDE 10 MG/ML
0.5 INJECTION, SOLUTION EPIDURAL; INFILTRATION; INTRACAUDAL; PERINEURAL ONCE AS NEEDED
Status: DISCONTINUED | OUTPATIENT
Start: 2024-10-24 | End: 2024-10-24 | Stop reason: HOSPADM

## 2024-10-24 RX ORDER — VANCOMYCIN HYDROCHLORIDE 1 G/20ML
INJECTION, POWDER, LYOPHILIZED, FOR SOLUTION INTRAVENOUS AS NEEDED
Status: DISCONTINUED | OUTPATIENT
Start: 2024-10-24 | End: 2024-10-24 | Stop reason: HOSPADM

## 2024-10-24 RX ORDER — ONDANSETRON 4 MG/1
4 TABLET, FILM COATED ORAL EVERY 8 HOURS PRN
Qty: 10 TABLET | Refills: 0 | Status: SHIPPED | OUTPATIENT
Start: 2024-10-24 | End: 2024-10-24

## 2024-10-24 RX ORDER — ROCURONIUM BROMIDE 10 MG/ML
INJECTION, SOLUTION INTRAVENOUS AS NEEDED
Status: DISCONTINUED | OUTPATIENT
Start: 2024-10-24 | End: 2024-10-24

## 2024-10-24 RX ORDER — OXYCODONE HYDROCHLORIDE 5 MG/1
5 TABLET ORAL EVERY 4 HOURS PRN
Qty: 30 TABLET | Refills: 0 | Status: SHIPPED | OUTPATIENT
Start: 2024-10-24

## 2024-10-24 RX ORDER — CHLORHEXIDINE GLUCONATE ORAL RINSE 1.2 MG/ML
15 SOLUTION DENTAL ONCE
Status: COMPLETED | OUTPATIENT
Start: 2024-10-24 | End: 2024-10-24

## 2024-10-24 RX ORDER — MAGNESIUM HYDROXIDE 1200 MG/15ML
LIQUID ORAL AS NEEDED
Status: DISCONTINUED | OUTPATIENT
Start: 2024-10-24 | End: 2024-10-24 | Stop reason: HOSPADM

## 2024-10-24 RX ORDER — OXYCODONE HYDROCHLORIDE 5 MG/1
5 TABLET ORAL ONCE AS NEEDED
Status: COMPLETED | OUTPATIENT
Start: 2024-10-24 | End: 2024-10-24

## 2024-10-24 RX ORDER — HYDROMORPHONE HCL/PF 1 MG/ML
0.5 SYRINGE (ML) INJECTION
Status: DISCONTINUED | OUTPATIENT
Start: 2024-10-24 | End: 2024-10-24 | Stop reason: HOSPADM

## 2024-10-24 RX ORDER — PROPOFOL 10 MG/ML
INJECTION, EMULSION INTRAVENOUS CONTINUOUS PRN
Status: DISCONTINUED | OUTPATIENT
Start: 2024-10-24 | End: 2024-10-24

## 2024-10-24 RX ORDER — SODIUM CHLORIDE, SODIUM LACTATE, POTASSIUM CHLORIDE, CALCIUM CHLORIDE 600; 310; 30; 20 MG/100ML; MG/100ML; MG/100ML; MG/100ML
125 INJECTION, SOLUTION INTRAVENOUS CONTINUOUS
Status: DISCONTINUED | OUTPATIENT
Start: 2024-10-24 | End: 2024-10-24 | Stop reason: HOSPADM

## 2024-10-24 RX ADMIN — MIDAZOLAM 1 MG: 1 INJECTION INTRAMUSCULAR; INTRAVENOUS at 10:01

## 2024-10-24 RX ADMIN — CHLORHEXIDINE GLUCONATE 15 ML: 1.2 RINSE ORAL at 09:04

## 2024-10-24 RX ADMIN — BUPIVACAINE 15 MG: 13.3 INJECTION, SUSPENSION, LIPOSOMAL INFILTRATION at 10:07

## 2024-10-24 RX ADMIN — ROCURONIUM BROMIDE 50 MG: 10 INJECTION INTRAVENOUS at 10:47

## 2024-10-24 RX ADMIN — FENTANYL CITRATE 50 MCG: 50 INJECTION INTRAMUSCULAR; INTRAVENOUS at 10:10

## 2024-10-24 RX ADMIN — FENTANYL CITRATE 50 MCG: 50 INJECTION INTRAMUSCULAR; INTRAVENOUS at 12:22

## 2024-10-24 RX ADMIN — PROPOFOL 200 MG: 10 INJECTION, EMULSION INTRAVENOUS at 10:47

## 2024-10-24 RX ADMIN — FENTANYL CITRATE 50 MCG: 50 INJECTION INTRAMUSCULAR; INTRAVENOUS at 10:00

## 2024-10-24 RX ADMIN — PROPOFOL 50 MCG/KG/MIN: 10 INJECTION, EMULSION INTRAVENOUS at 11:14

## 2024-10-24 RX ADMIN — OXYCODONE HYDROCHLORIDE 5 MG: 5 TABLET ORAL at 13:18

## 2024-10-24 RX ADMIN — BUPIVACAINE HYDROCHLORIDE 10 ML: 5 INJECTION, SOLUTION EPIDURAL; INTRACAUDAL at 10:07

## 2024-10-24 RX ADMIN — LIDOCAINE HYDROCHLORIDE 100 MG: 20 INJECTION, SOLUTION EPIDURAL; INFILTRATION; INTRACAUDAL at 10:47

## 2024-10-24 RX ADMIN — BUPIVACAINE HYDROCHLORIDE 10 ML: 5 INJECTION, SOLUTION EPIDURAL; INTRACAUDAL at 10:12

## 2024-10-24 RX ADMIN — DEXAMETHASONE SODIUM PHOSPHATE 10 MG: 10 INJECTION, SOLUTION INTRAMUSCULAR; INTRAVENOUS at 10:47

## 2024-10-24 RX ADMIN — FENTANYL CITRATE 50 MCG: 50 INJECTION INTRAMUSCULAR; INTRAVENOUS at 12:12

## 2024-10-24 RX ADMIN — CEFAZOLIN SODIUM 2000 MG: 2 SOLUTION INTRAVENOUS at 10:42

## 2024-10-24 RX ADMIN — ONDANSETRON 4 MG: 2 INJECTION INTRAMUSCULAR; INTRAVENOUS at 11:32

## 2024-10-24 RX ADMIN — BUPIVACAINE 5 ML: 13.3 INJECTION, SUSPENSION, LIPOSOMAL INFILTRATION at 10:12

## 2024-10-24 RX ADMIN — FENTANYL CITRATE 50 MCG: 50 INJECTION INTRAMUSCULAR; INTRAVENOUS at 10:54

## 2024-10-24 RX ADMIN — MIDAZOLAM 1 MG: 1 INJECTION INTRAMUSCULAR; INTRAVENOUS at 10:10

## 2024-10-24 RX ADMIN — SODIUM CHLORIDE, SODIUM LACTATE, POTASSIUM CHLORIDE, AND CALCIUM CHLORIDE: .6; .31; .03; .02 INJECTION, SOLUTION INTRAVENOUS at 10:42

## 2024-10-24 RX ADMIN — SUGAMMADEX 250 MG: 100 INJECTION, SOLUTION INTRAVENOUS at 11:47

## 2024-10-24 NOTE — ANESTHESIA PROCEDURE NOTES
Peripheral Block    Patient location during procedure: holding area  Start time: 10/24/2024 10:00 AM  Reason for block: at surgeon's request and post-op pain management  Staffing  Performed by: Kyle Patel MD  Authorized by: Kyle Patel MD    Preanesthetic Checklist  Completed: patient identified, IV checked, site marked, risks and benefits discussed, surgical consent, monitors and equipment checked, pre-op evaluation and timeout performed  Peripheral Block  Patient position: supine (L Leg elevated on Sidetable)  Prep: ChloraPrep  Patient monitoring: continuous pulse oximetry and heart rate  Block type: Popliteal  Laterality: left  Injection technique: single-shot  Procedures: ultrasound guided, Ultrasound guidance required for the procedure to increase accuracy and safety of medication placement and decrease risk of complications.  Ultrasound permanent image saved    Needle  Needle type: Stimuplex   Needle gauge: 20 G  Needle length: 4 in  Needle localization: ultrasound guidance  Needle insertion depth: 2 cm  Assessment  Injection assessment: frequent aspiration, injected with ease, negative aspiration, no paresthesia on injection, no symptoms of intraneural/intravenous injection, negative for heart rate change, needle tip visualized at all times and incremental injection  Paresthesia pain: none  Post-procedure:  site cleaned  patient tolerated the procedure well with no immediate complications

## 2024-10-24 NOTE — INTERVAL H&P NOTE
H&P reviewed. After examining the patient I find no changes in the patients condition since the H&P had been written.    Vitals:    10/24/24 0901   BP: 137/64   Pulse: (!) 106   Resp: 20   Temp: 98.9 °F (37.2 °C)   SpO2: 95%       Plan for left achilles tendon repair.

## 2024-10-24 NOTE — ANESTHESIA PROCEDURE NOTES
Peripheral Block    Patient location during procedure: holding area  Start time: 10/24/2024 10:00 AM  Reason for block: at surgeon's request and post-op pain management  Staffing  Performed by: Kyle Patel MD  Authorized by: Kyle Patel MD    Preanesthetic Checklist  Completed: patient identified, IV checked, site marked, risks and benefits discussed, surgical consent, monitors and equipment checked, pre-op evaluation and timeout performed  Peripheral Block  Patient position: supine (L Thigh Abducted)  Prep: ChloraPrep  Patient monitoring: continuous pulse oximetry and heart rate  Block type: Adductor Canal  Laterality: left  Injection technique: single-shot  Procedures: ultrasound guided, Ultrasound guidance required for the procedure to increase accuracy and safety of medication placement and decrease risk of complications.  Ultrasound permanent image saved    Needle  Needle type: Stimuplex   Needle length: 4 in  Needle localization: ultrasound guidance  Needle insertion depth: 2.5 cm  Assessment  Injection assessment: incremental injection, injected with ease, needle tip visualized at all times, negative for heart rate change, negative aspiration, no symptoms of intraneural/intravenous injection, no paresthesia on injection and frequent aspiration  Paresthesia pain: none  Post-procedure:  site cleaned  patient tolerated the procedure well with no immediate complications

## 2024-10-24 NOTE — ANESTHESIA POSTPROCEDURE EVALUATION
Post-Op Assessment Note    CV Status:  Stable  Pain Score: 0    Pain management: adequate    Multimodal analgesia used between 6 hours prior to anesthesia start to PACU discharge    Mental Status:  Awake   Hydration Status:  Stable   PONV Controlled:  None   Airway Patency:  Patent     Post Op Vitals Reviewed: Yes    No anethesia notable event occurred.    Staff: CRNA           Last Filed PACU Vitals:  Vitals Value Taken Time   Temp     Pulse 99 10/24/24 1157   /80 10/24/24 1155   Resp 17 10/24/24 1157   SpO2 98 % 10/24/24 1157   Vitals shown include unfiled device data.    Modified Abdi:  No data recorded

## 2024-10-24 NOTE — OP NOTE
OPERATIVE REPORT  PATIENT NAME: Raghav Blair    :  1990  MRN: [id number]  Pt Location: [facility] OR ROOM 06    SURGERY DATE: 10/24/2024    Surgeons and Role:     * James R Lachman, MD - Primary  ALANNA Aldrich- assisting  Chapin Heard MD- assisting resident        Preop Diagnosis:  Achilles tendon rupture, left, initial encounter [S86.012A]    Post-Op Diagnosis Codes:     * Achilles tendon rupture, left, initial encounter [S86.012A]    Procedure(s):  Left - REPAIR TENDON ACHILLES    Specimen(s):  * No specimens in log *    Estimated Blood Loss:   Minimal    Drains:  * No LDAs found *    Anesthesia Type:   Choice    Operative Indications:  Achilles tendon rupture, left, initial encounter [S86.012A]      Operative Findings:  Consistent with diagnosis      Complications:   None    Procedure and Technique:  Mini-open achilles tendon repair  Release/Partial fasciotomy of deep posterior compartment   Placement into short leg nonweightbearing plaster splint    The patient was placed prone and all bony prominences were padded.  A thigh tourniquet was placed. An esmarch was used to exsanguinate the leg and the tourniquet was inflated to 250mm Hg.  See anesthesia documentation for tourniquet time.      Attention was turned to the central achilles.  A longitudinal, 2 cm incision was made over the palpable gap.  Sharp dissection was taken through the skin and bovie electrocautery was used to control hemostasis.  Scissor dissection was used for the deep dissection.  Next, a midline incision was made in the paratenon which was still intact which exposed the achilles tendon rupture.  We exposed the proximal and distal segments. We used a freer to free any adhesions of the paratenon to the tendon.    We then retracted the tendon and exposed the fascia of the posterior compartment. We released this fascia to allow blood supply from this compartment to aid in the supply of the achilles repair site.    We the  placed the PARS jig proximally inside the paratenon while using counter-traction on the tendon with an clamp.  We began passing the suture #1-5 sequentially, percutaneously through the tendon.  After we passed the last suture, we pulled the PARS jig out of the wound and all 5 sutures followed on both sides.    We did the same thing distally, passing all five sutures sequentially through the jig and then removing the jig with the sutures now inside the paratenon to prevent sural nerve entrapment or injury.    We then used the described technique to lock the blue suture as described in the operations technique guide. We confirmed by pulling on the suture that each had great purchase in the substance of the tendon and then began tieing them to each corresponding suture. The foot was held in plantarflexion to allow good contact between the two ends of the tendon.    We the used an 0-vicryl to oversew the tendon to give added strength to the repair.  We confirmed restoration of resting equinus tone and had a return of the [place] test.    Copious irrigation was used.  Vancomycin powder was used in the wound bed. Next, the paratenon was closed with a running 2-0 vicryl suture.  4-0 vicryl was used subcutaneously and 3-0 nylon was used for the skin. Sterile dressings were applied and the leg was splinted using a posterior-U.     I was present for the entire procedure     I was present for the entire procedure.    Patient Disposition:  PACU              SIGNATURE: James R Lachman, MD  DATE: October 24, 2024  TIME: 9:25 AM

## 2024-10-24 NOTE — ANESTHESIA POSTPROCEDURE EVALUATION
Post-Op Assessment Note    CV Status:  Stable  Pain Score: 0    Pain management: adequate       Mental Status:  Alert and awake   Hydration Status:  Euvolemic   PONV Controlled:  Controlled   Airway Patency:  Patent     Post Op Vitals Reviewed: Yes    No anethesia notable event occurred.    Staff: Anesthesiologist           Last Filed PACU Vitals:  Vitals Value Taken Time   Temp 99.2 °F (37.3 °C) 10/24/24 1154   Pulse 95 10/24/24 1237   /66 10/24/24 1230   Resp 19 10/24/24 1237   SpO2 98 % 10/24/24 1237   Vitals shown include unfiled device data.    Modified Abdi:  Activity: 2 (10/24/2024 12:30 PM)  Respiration: 2 (10/24/2024 12:30 PM)  Circulation: 2 (10/24/2024 12:30 PM)  Consciousness: 2 (10/24/2024 12:30 PM)  Oxygen Saturation: 2 (10/24/2024 12:30 PM)  Modified Abdi Score: 10 (10/24/2024 12:30 PM)

## 2024-10-24 NOTE — DISCHARGE INSTR - AVS FIRST PAGE
James R Lachman, M.D.  Attending, Orthopaedic Surgery  [facility]  [place]ajan Office Phone: [phone] ? Fax: [phone]  Lisa Office Phone: [phone] ? Fax:[phone]    : Gemma Boucher MA    Surgery Coordinators Rajan: Mary Lou Melendez, [phone]  Jennyfer Elvis, [phone]  Surgery Coordinator Lisa:  Stan Pate, [phone]                                                        Suzie Singh, [phone]                                                                                                                      [email or web address]   POST-OPERATIVE INSTRUCTIONS    General Information:  Typical post operative visits are at the following intervals:  2-3 weeks post surgery, 6 weeks post surgery, 3 months post surgery, 6 months post surgery, and then on a yearly basis.  However, this may change based on Dr. Lachmans’ recommendation.  #1 post-operative rule for foot/ankle surgery:  ONCE YOU ARE OUT OF YOUR CAST AND/OR REMOVABLE BOOT, SWELLING MAY PERSIST FOR MANY MONTHS.  YOU MIGHT ALSO EXPERIENCE A BLUISH DISCOLORATION OF YOUR LEG.  THIS IS NORMAL AND PART OF THE USUAL POSTOPERATIVE EXPERIENCE.  DO NOT WAIT UNTIL YOUR BLOCK WEARS OFF TO TAKE YOUR PAIN MEDICATION.  IT TAKES A FEW DOSES OF THE PAIN MEDICATION TO REACH A THERAPEUTIC LEVEL.  TAKE A TABLET PROACTIVELY BEFORE YOU HAVE ANY PAIN AND AGAIN 4 HOURS LATER SO WHEN THE BLOCK WEARS OFF, YOU ARE NOT CAUGHT OFF GUARD.    SMOKING:  Smoking results in incomplete healing of fractures (broken bones) and joints that my have been fused.  Smoking and nicotine also prevents the growth of bone into ankle replacements and bone healing.  It also slows the healing of muscles and skin (soft tissue).  Therefore, please do not have surgery if you continue to smoke.  We reserve the right to cancel your surgery if we suspect that you are smoking.  DO NOT use  nicorette gum or other patches.  Please find an alternative method to quit smoking before your surgery and do not restart after surgery to allow for healing.      THREE RULES:    After surgery you will most likely be given the instructions “KEEP YOUR TOES ABOVE YOUR NOSE.”  This means that you MUST have your feet elevated higher than your heart.  Keeping your toes above your nose helps to heal the muscles and skin (soft tissues) by reducing swelling in your leg.  This position also helps to prevent infection, and is very important in avoiding deep venous thrombosis (blood clots).    In order to keep the blood circulating in your legs and in order to avoid deep vein   thrombosis (blood clots), we ask patients to GET UP ONCE AN HOUR during the day.  This means you should at least cross the room and come back.  It does not mean you have to be up for long periods of time.  In most cases we will not have people immediately put any weight on their operated part.  This is important to prevent loosening of metal or other devices holding the bones together.  It also prevents irritation of the soft tissues which can lead to prolonged healing.  When we say get up once an hour, please walk, hop or move with an assisted device.  This is important!    Do not do any excessive walking during the first few days after surgery.  Recovering from surgery is a full-time task for the patient.  Postoperative care is important to avoid irritating the skin incision, which can lead to infection.  Please do not plan activities or go out of town for several weeks after surgery.        AFTER YOUR SURGERY:  Bleeding through the bandage almost always occurs.  Do not let this alarm you.  Simply overwrap with an ABD pad and Ace bandage (The nurses discharging your from the day of surgery will provide this.)   If you think it is excessive, you can come in early for a dressing change (at around 1 week instead of 3 weeks postop.)    Do not get the  bandage wet.  Showering is possible with plastic protectors.   Be very careful, as the bathroom can be wet and slippery.  If you do get your dressing wet, it should be changed immediately.  Please contact us.      ONCE YOUR ARE OUT OF YOUR CAST AND/OR REMOVABLE BOOT, SWELLING MAY PERSIST FOR MANY MONTHS.  THERE WILL ALSO BE A BLUISH DISCOLORATION OF YOUR LEG FOR MONTHS.  THIS IS NORMAL AND PART OF THE USUAL POSTOPERATIVE EXPERIENCE.  WEARING COMPRESSION HOSE (ELASTIC STOCKINGS) CAN HELP AVOID SOME OF THIS SWELLING.    Ice the area 20 minutes every hour once the nerve block wears off. If you are in a cast or a splint, you may need to leave the ice on longer than 20 minutes in order to feel any benefits.       DRESSING:   The purpose of the surgical dressing is to keep your wound and the surgical site protected from the environment.  Most dressings contain splints, which help to hold your foot and ankle in a corrected position, and also allow the surgical site to heal properly. IF YOU GO TO THE EMERGENCY ROOM FOR ANY REASON, AND THEY REMOVE YOUR DRESSING OR SPLINT, YOU MUST BE SEEN IN DR. LACHMANS CLINIC TO HAVE IT REPLACED) AS SOON AS POSSIBLE (IDEALLY THE NEXT DAY).   If you have a drain in place, this will need to be removed in 1 day after surgery.  The time for the drain to be pulled will be written on your discharge instruction sheet.    CAST  INSTRUCTIONS:  You may or may not get a cast following surgery.  If you do, pay close attention to the following:    After application of a splint or cast, it is very important to elevate your leg for 24 to 72 hours.   The injured area should be elevated well above the heart.   Remember “Toes above your Nose”.  Rest and elevation greatly reduce pain and speed the healing process by minimizing early swelling.    CALL YOUR DOCTORS OFFICE OR VISIT LOCATION EMERGENCY ROOM IF YOU HAVE ANY OF THE FOLLOWING:    Significant increased pain, which may be caused by swelling (Strict  elevation will alleviate this)  Numbness and tingling in your hand or foot, which may be caused by too much pressure on the nerves (There is always numbness after surgery due to nerve blocks)  Burning and stinging, which may be caused by too much pressure on the skin  Excessive swelling below the cast, which may mean the cast is slowing your blood circulation  Loss of active movement of toes, which request an urgent evaluation (if you have had a nerve block, this is an expected thing and totally normal)  Loss of “capillary refill”.  Pinch the tip of toes and armin the skin.  Release pressure and if the skin does not return pink then call the office immediately.      DO NOT GET YOUR CAST WET.   Bacteria thrive in moist dark areas.  We do not want this.   If your cast becomes wet, return to the office and we will apply another one.    PAIN AFTER SURGERY:  Narcotic pain medication can and will depress your respiratory system if taken in excess.  The goal of pain management with narcotics is to be comfortable not pain free.  If you take enough narcotics to be pain free then you run the risk of stopping breathing.  If this happens, call 911 immediately!  Pain in the heel is often  caused by pressure from the weight of your foot on the bed.  Make sure your heel is suspended off the bed by keeping a pillow underneath your calf not your knee.    Medications:  You will be given narcotic pain medication. Do NOT drive while taking narcotic medications. Medications such as Darvocet, Percocet, Vicoden or Tylenol #3, also contain acetaminophen (Tylenol). Do not take acetaminophen or Tylenol from home when taking theses medications. When you fill your prescription, you may ask the pharmacist if your pain medication has acetaminophen/Tylenol in it. It is okay to take Tylenol with Oxycontin/Oxycodone.   Take Xarelto as prescribed to help prevent blood clots.   Narcotic medications commonly cause nausea. Taking them with food will  decrease this side effect. If you are having extreme nausea, please contact us for an alternative medication or for something that can be taken with this medication to decrease the nausea.   Also, narcotic medications frequently cause constipation. An increase of fiber, fruits and vegetables in your diet may alleviate this problem, or if necessary, you may use an over-the-counter medication such as senekot, colace, or Fibercon for constipation problems.   You should resume all medications you were taking prior to the surgery unless otherwise specified.   If you had fracture surgery, bony surgery like an osteotomy or fusion, or a surgery that requires bone healing, you are advised to take Vitamin D and Calcium to improve healing potential.  Vitamin D3 4000 units/day and Calcium 1200mg/day. These are over the counter medications so please pick them up at the pharmacy when you are picking up your prescriptions.    Activity:   Because of your recent foot surgery, your activity level will decrease. You will need to elevate your foot ABOVE the level of your heart for a minimum of four days. The length of time necessary for the swelling to go down, and for your wounds to heal properly depends greatly on your efforts here. Elevation is extremely important to avoid compromising the blood supply to your foot. Remember when your foot is down it will swell, which will increase pain and slow healing. Wiggle your toes frequently if possible.   If you go home with a regional block, (a type of anesthesia) the foot and leg will be numb. Think of ways to get into your house and around the house until the block wears off.   Keep in mind that it may be a legal issue if you drive while in a cast or splint, especially when the splint is on the right foot. You may call the Department of Motor Vehicles to schedule a road test if you have adaptive equipment applied to your car.   The amount of weight you are allowed to bear on your foot  will be written on your discharge sheet filled out at the time of surgery. The following is an explanation of the possibilities:     Non-weight bearing:   You are to put NO weight whatsoever on your foot. When using crutches or a walker, your foot should not touch the ground, except when you are standing. Then, it may rest on the ground. If you are to be non-weight bearing, and you are not compliant, you could compromise the surgery.     Some of our patients have been requesting prescriptions for a roll-a-bout knee scooter. [employer] and other insurances have been denying these claims, and you may either have to rent one or pay out of pocket to purchase one.

## 2024-11-12 ENCOUNTER — OFFICE VISIT (OUTPATIENT)
Dept: OBGYN CLINIC | Facility: CLINIC | Age: 34
End: 2024-11-12

## 2024-11-12 VITALS — HEIGHT: 76 IN | BODY MASS INDEX: 29.22 KG/M2 | WEIGHT: 240 LBS

## 2024-11-12 DIAGNOSIS — S86.012D RUPTURE OF LEFT ACHILLES TENDON, SUBSEQUENT ENCOUNTER: Primary | ICD-10-CM

## 2024-11-12 DIAGNOSIS — Z09 S/P ORTHOPEDIC SURGERY, FOLLOW-UP EXAM: ICD-10-CM

## 2024-11-12 PROCEDURE — 99024 POSTOP FOLLOW-UP VISIT: CPT | Performed by: ORTHOPAEDIC SURGERY

## 2024-11-12 NOTE — PATIENT INSTRUCTIONS
Continue xarelto/aspirin/lovenox for blood clot prevention  May shower, do not soak in a tub/pool/ocean/etc for another 4 weeks.  Begin PT  Scar massage- pea sized amount of lotion, massage into scar for 5 minutes each day.  Compression stocking (Knee high, 20-30mm Hg) to be worn at all times while awake.        Wear the boot at all times except when showering and in PT, even to sleep at night.        Achilles Rupture - Rehab Protocol     OVERVIEW:  Week 0: Surgery  Avoid prolonged dependent position (keep foot elevated)  Week 3: CAM walker boot with 3 heel wedges, weightbearing as tolerated.  remove one wedge each week (so down to 2 wedges at week 5, 1 wedge at week 6, 0 wedges at week 7). Begin PT     Week 8: continue PT, transition to shoe with heel lift, wean off crutches  PT 2-3 times/week and home exercises daily  Progress with PT over ~4 months  Week 12: wean off of shoe lift  Week 20 / 5 months: gentle jog  Week 24 / 6 months:  Gradual return to sports as tolerated     DETAILED PHYSICAL THERAPY PROTOCOL:  Initial Phase  Begin gentle ROM, edema control, and progress to gentle strengthening as tolerated.  No dorsiflexion past neutral.     Phase I: weeks 6 - 10  Goals:  Normalize gait  Progress range of motion  Normalize dorsiflexion, inversion, and eversion ankle strength 5/5  Treatment Recommendations:  Gait training, wean off crutches/cane when gait non-antalgic  Heel lift in shoe to assist non-apprehensive and normalized gait  AROM dorsiflexion/plantarflexion/inversion/eversion  Proprioception training  Isometrics/isotonics: inversion/eversion  Sitting heel rise  PREs plantarflexion/dorsiflexion with knee flexed to 90; after 2 weeks, progress to PREs with knee extended to 0  Leg press  Bike  Alphabet drawing  Retro treadmill  Forward step up program  Underwater treadmill system for gait training  Precautions:  Avoid passive heel cord stretching  Minimum Criteria for Advancement:  Normal gait pattern  Manual  muscle grade test of 5/5 for dorsiflexion, inversion, and eversion     Phase II: weeks 10 - 20  Goals:  Restore full functional range of motion  Normalize plantarflexion strength 5/5  Normalize balance  Return to functional activities without pain  Ability to descend stairs  Treatment Recommendations:  Submaximal sport-specific skill development  Proprioception training: BAPS, prop board, foam rollers, trampoline, [employer]  Isometrics/isotonics: inversion/eversion  Isokinetic plantarflexion/dorsiflexion  Standing heel rise  Aggressive PREs plantarflexion  Progress proximal strengthening (PREs)  Amy Hoffman Versaclimber  Forward step down program  Running in underwater treadmill system  Precautions:  Avoid pain with therapeutic exercise and functional activities  Avoid high loading the Achilles tendon (i.e. aggressive stretching in dorsiflexion with body weight or jumping)  Minimum Criteria for Advancement:  No apprehension with activities of daily living  Normal flexibility  Adequate strength base shown by ability to perform ten unilateral heel raises  Ability to descend stairs reciprocally  Symmetrical lower extremity balance     Phase III: weeks 20 - 28  Goals:  Demonstrate ability to run forward on a treadmill symptom-free  Average peak torque of 75% with isokinetic testing  Maximize strength and flexibility as to meet all demands of ADLs  Return to functional activity without limitation  Higher level of dynamic activity with lack of apprehension with sport-specific movements  Treatment Recommendations:  Start forward treadmill running  Isokinetic testing and training  Continue lower extremity strengthening and flexibility program  Advance proprioception training with perturbation  Light plyometric training (bilateral jumping activities)  Continue aggressive plantarflexion PREs (emphasize eccentric activity)  Submaximal sport specific skill development drills  Progress Amy hoffman  Versaclimber  Continue to progress proximal strengthening of lower extremities (PREs)  Precautions:  No apprehension or pain with dynamic activity  Avoid running or sport activity until adequate strength and flexibility is achieved  Minimum Criteria for Advancement:  Pain free running  Average peak torque of isokinetic test = 75% of non-involved  Normal strength (5/5 throughout ankle)  Sports-specific drills with zero apprehension     Phase IV: Return to Sport (weeks 28 - one year)  Goals:  Lack of apprehension with sports activity  Maximize strength and flexibility as to meet demands of individual's sport activity  Treatment Recommendations:  Advanced functional exercises and agility exercises  Plyometrics  Sport-specific exercises  Isokinetic testing  Functional test assessment (such as vertical jump test)  Precautions:  Avoid pain with therapeutic, functional, and sport activity  Avoid full sport activity until adequate strength and flexibility  Criteria for Discharge:  Flexibility and strength to accepted levels for sports performance  Lack of apprehension with sport-specific movements  85% limb symmetry with vertical jump test  85% limb symmetry for average peak isokinetic torque (PF/DF/inv/ev)  Independent performance of gym/home exercise program

## 2024-11-12 NOTE — PROGRESS NOTES
"      James R Lachman, M.D.  Attending, Orthopaedic Surgery  Foot and Ankle  [facility]      ORTHOPAEDIC FOOT AND ANKLE POST-OP VISIT     Procedure:     Left achilles tendon repair       Date of surgery:   10/24/24      PLAN  1. Weightbearing Status- WBAT operative extremity  2. DVT prophylaxis-  BID  3. Continue to elevate 23hrs/day getting up 1x per hour to prevent a blood clot  4. Pain control- OTC pain medication  5. RTC in 3 week(s)  6. Xrays needed next visit - no     History of Present Illness:   Chief Complaint:   Chief Complaint   Patient presents with    Post-op     Post op 3 weeks. Splint off and stiches out.   Repair Tendon Achilles - Left       Raghav Blair is a 34 y.o. male who is being seen for post-operative visit for the above procedure. Pain is well controlled and the patient has successfully transitioned to OTC pain medicines.  he is taking ASA 325mg BID for DVT prophylaxis. Patient has been NWB in a Splint.      Review of Systems:  General- denies fever/chills  Respiratory- denies cough or SOB  Cardio- denies chest pain or palpitations  GI- denies abdominal pain  Musculoskeletal- Negative except noted above  Skin- denies rashes or wounds    Physical Exam:   Ht 6' 4\" (1.93 m)   Wt 109 kg (240 lb)   BMI 29.21 kg/m²   General/Constitutional: No apparent distress: well-nourished and well developed.  Eyes: normal ocular motion  Lymphatic: No appreciable lymphadenopathy  Respiratory: Non-labored breathing  Vascular: No edema, swelling or tenderness, except as noted in detailed exam.  Integumentary: No impressive skin lesions present, except as noted in detailed exam.  Neuro: No ataxia or tremors noted  Psych: Normal mood and affect, oriented to person, place and time. Appropriate affect.  Musculoskeletal: Normal, except as noted in detailed exam and in HPI.    Examination    left        Incision Clean, dry, intact  Sutures Removed this visit    Ecchymosis none  "   Swelling Mild    Sensation Intact to light touch throughout sural, saphenous, superficial peroneal, deep peroneal and medial/lateral plantar nerve distributions.  [place]-Maryann 5.07 filament (10g) testing deferred.    Cardiovascular Brisk capillary refill < 2 seconds,intact DP and PT pulses    Special Tests None        Scribe Attestation      I,:  Lily Gay am acting as a scribe while in the presence of the attending physician.:       I,:  James R Lachman, MD personally performed the services described in this documentation    as scribed in my presence.:               James R. Lachman, MD  Foot & Ankle Surgery   Department of Orthopaedic Surgery  [facility]      I personally performed the service.    James R. Lachman, MD

## 2024-11-18 ENCOUNTER — EVALUATION (OUTPATIENT)
Dept: PHYSICAL THERAPY | Facility: CLINIC | Age: 34
End: 2024-11-18
Payer: COMMERCIAL

## 2024-11-18 VITALS — SYSTOLIC BLOOD PRESSURE: 132 MMHG | DIASTOLIC BLOOD PRESSURE: 75 MMHG

## 2024-11-18 DIAGNOSIS — S86.012D RUPTURE OF LEFT ACHILLES TENDON, SUBSEQUENT ENCOUNTER: ICD-10-CM

## 2024-11-18 PROCEDURE — 97162 PT EVAL MOD COMPLEX 30 MIN: CPT | Performed by: PHYSICAL THERAPIST

## 2024-11-18 PROCEDURE — 97112 NEUROMUSCULAR REEDUCATION: CPT | Performed by: PHYSICAL THERAPIST

## 2024-11-18 PROCEDURE — 97140 MANUAL THERAPY 1/> REGIONS: CPT | Performed by: PHYSICAL THERAPIST

## 2024-11-18 NOTE — PROGRESS NOTES
PT Evaluation     Today's date: 2024  Patient name: Raghav Blair  : 1990  MRN: [id number]  Referring provider: Lachman, James R, MD  Dx:   Encounter Diagnosis     ICD-10-CM    1. Rupture of left Achilles tendon, subsequent encounter  S86.012D Ambulatory Referral to Physical Therapy                     Assessment  Impairments: abnormal coordination, abnormal gait, abnormal muscle firing, abnormal muscle tone, abnormal or restricted ROM, abnormal movement, activity intolerance, impaired physical strength, lacks appropriate home exercise program, pain with function, safety issue, weight-bearing intolerance and poor posture   Functional limitations: Difficulty with amb, stair negotiation, receational activities.  Symptom irritability: moderate    Assessment details: Raghav Blair is a 34 y.o. male who presents to outpatient PT with a  Rupture of left Achilles tendon, subsequent encounter.  No further referral appears necessary at this time based upon examination results. Pt presents with decreased strength, ROM, balance, functional activity tolerance, and pain with movement in his left foot /ankle which is  limiting his ability to perform the aforementioned functional activities.  Etiologic factors include repetitive poor body mechanics. Prognosis is good given HEP compliance and PT 2-3/wk.  Please contact me if you have any questions or recommendations.  Thank you for the opportunity to share in  [facility].     Understanding of Dx/Px/POC: good     Prognosis: good    Goals  STG to be achieved in 4 weeks     1. Pt will reduce subjective pain rating by at least 50 percent the help facilitate return to PLOF  2. Pt will improve Left ankle AROM by at least 10 degrees to help promote improved functional activity tolerance   3. Pt will improve Left ankle MMT scores by at least 1/2 grade to promote improved functional activity tolerance      LTG to be achieved in 6-8 weeks   1. Pt will be  complaint with HEP   2. Pt will improve Left ankle AROM to WFL, to help facilitate independence with ADL's, IADL's, and functional activities   3. Pt will improve Left ankle Strength to WFL to help facilitate independence with ADL's, IADL's, and functional activities   4. Pt will have no limitations with ambulation to help facilitate independence at home and in the community.   5. Pt will have no limitations with stair negotiation to help facilitate independence at home and in the community           Plan  Patient would benefit from: skilled physical therapy    Planned therapy interventions: ADL training, balance, balance/weight bearing training, flexibility, functional ROM exercises, gait training, graded activity, graded exercise, graded motor, home exercise program, joint mobilization, manual therapy, neuromuscular re-education, patient education, postural training, strengthening, stretching, therapeutic activities and therapeutic exercise    Frequency: 2x week  Duration in weeks: 12  Plan of Care beginning date: 2024  Plan of Care expiration date: 2025  Treatment plan discussed with: patient, PTA and referring physician        Subjective Evaluation    History of Present Illness  Mechanism of injury: 34 year old male presents to outpatient PT S/P left achilles repair Dr. Lachman DOS 10/24  Doing well post operatively. He is ambulating with no AD this date FWB on LLE. He is currently wearing three achilles wedges. And will be taking one wedge out each week. Will follow Dr. Lachman Achilles repair protocol.    Patient Goals  Patient goals for therapy: increased strength and decreased pain  Patient goal: to get back to normal  Pain  Current pain ratin  At best pain ratin  At worst pain ratin        Objective     Active Range of Motion   Left Ankle/Foot   Dorsiflexion (ke): -35 degrees   Plantar flexion: 50 degrees   Inversion: 25 degrees   Eversion: 5 degrees     Additional Active Range of  Motion Details  Strength NT this date     Surgical incision(s) inspected. Incision(s) clean, dry and intact with no signs/symptoms of infection. Patient was educated on signs/symptoms of infection and was advised to contact MD immediately if suspect infection.                Precautions: S/P Left achilles repair DOS 10/24 Dr. Lachman      Manuals 11/18            PROM Left ankle Avoid passive DF  PF INV EV                                                    Neuro Re-Ed             Ankle isometrics HEP            Ankle ABC HEP             Ankle AROM all planes  HEP             Baps Board              NU step in CAM boot                                        Ther Ex                                                                                                                     Ther Activity                                       Gait Training                                       Modalities

## 2024-11-20 ENCOUNTER — OFFICE VISIT (OUTPATIENT)
Dept: PHYSICAL THERAPY | Facility: CLINIC | Age: 34
End: 2024-11-20
Payer: COMMERCIAL

## 2024-11-20 DIAGNOSIS — S86.012D RUPTURE OF LEFT ACHILLES TENDON, SUBSEQUENT ENCOUNTER: Primary | ICD-10-CM

## 2024-11-20 PROCEDURE — 97112 NEUROMUSCULAR REEDUCATION: CPT

## 2024-11-20 PROCEDURE — 97140 MANUAL THERAPY 1/> REGIONS: CPT

## 2024-11-20 NOTE — PROGRESS NOTES
"Daily Note     Today's date: 2024  Patient name: Raghav Blair  : 1990  MRN: [id number]  Referring provider: Lachman, James R, MD  Dx:   Encounter Diagnosis     ICD-10-CM    1. Rupture of left Achilles tendon, subsequent encounter  S86.012D                      Subjective: Pt has remove one wedge form his CAM boot yesterday. He had minor soreness prior to PT secondary to the wedge removal.       Objective: See treatment diary below      Assessment: Pt amb to PT in his CAM boot, w/o and AD. Initiated multiple exercises as outlined in pt's POC, following the MD protocol. Tolerated treatment well. Patient demonstrated fatigue post treatment, exhibited good technique with therapeutic exercises, and would benefit from continued PT      Plan: Continue per plan of care.  Progress treament per protocol.      Precautions: S/P Left achilles repair DOS 10/24 Dr. Lachman      Manuals            PROM Left ankle Avoid passive DF  PF INV EV  PF INV EV     TM                                                  Neuro Re-Ed             Ankle isometrics HEP 5\" x10 ea           Ankle ABC HEP  x1           Ankle AROM all planes  HEP  X20 ea           Baps Board   X20 ea dir           NU step in CAM boot              Upright bike for endurance (in CAM boot)   L4 x10 min           Toe curls   X2 min           Towel IV/EV  X2 min ea           Seated HR  x20           Ther Ex             SLR x4 dir  X20 ea dir                                                                                                      Ther Activity                                       Gait Training                                       Modalities                                            "

## 2024-11-25 ENCOUNTER — OFFICE VISIT (OUTPATIENT)
Dept: PHYSICAL THERAPY | Facility: CLINIC | Age: 34
End: 2024-11-25
Payer: COMMERCIAL

## 2024-11-25 DIAGNOSIS — S86.012D RUPTURE OF LEFT ACHILLES TENDON, SUBSEQUENT ENCOUNTER: Primary | ICD-10-CM

## 2024-11-25 PROCEDURE — 97140 MANUAL THERAPY 1/> REGIONS: CPT | Performed by: PHYSICAL THERAPIST

## 2024-11-25 PROCEDURE — 97112 NEUROMUSCULAR REEDUCATION: CPT | Performed by: PHYSICAL THERAPIST

## 2024-11-25 NOTE — PROGRESS NOTES
"Daily Note     Today's date: 2024  Patient name: Raghav Blair  : 1990  MRN: [id number]  Referring provider: Lachman, James R, MD  Dx:   Encounter Diagnosis     ICD-10-CM    1. Rupture of left Achilles tendon, subsequent encounter  S86.012D                      Subjective: Pt doing well. Will be removing 1 wedge tomorrow . No new complaints       Objective: See treatment diary below      Assessment: Tolerated treatment well. Patient exhibited good technique with therapeutic exercises and would benefit from continued PT      Plan: Continue per plan of care.      Precautions: S/P Left achilles repair DOS 10/24 Dr. Lachman      Manuals           PROM Left ankle Avoid passive DF  PF INV EV  PF INV EV     TM PF INV EV     RR                                                 Neuro Re-Ed             Ankle isometrics HEP 5\" x10 ea 5''20x           Ankle ABC HEP  x1 x1          Ankle AROM all planes  HEP  X20 ea x20          Baps Board   X20 ea dir X20 each           NU step in CAM boot              Upright bike for endurance (in CAM boot)   L4 x10 min L4 x10 min          Toe curls   X2 min X2 min          Towel IV/EV  X2 min ea X2 min           Seated HR  x20 X20           Ther Ex             SLR x4 dir  X20 ea dir X20 each dir                                                                                                      Ther Activity                                       Gait Training                                       Modalities                                              "

## 2024-11-26 ENCOUNTER — OFFICE VISIT (OUTPATIENT)
Dept: PHYSICAL THERAPY | Facility: CLINIC | Age: 34
End: 2024-11-26
Payer: COMMERCIAL

## 2024-11-26 DIAGNOSIS — S86.012D RUPTURE OF LEFT ACHILLES TENDON, SUBSEQUENT ENCOUNTER: Primary | ICD-10-CM

## 2024-11-26 PROCEDURE — 97140 MANUAL THERAPY 1/> REGIONS: CPT

## 2024-11-26 PROCEDURE — 97112 NEUROMUSCULAR REEDUCATION: CPT

## 2024-11-26 PROCEDURE — 97110 THERAPEUTIC EXERCISES: CPT

## 2024-12-02 ENCOUNTER — OFFICE VISIT (OUTPATIENT)
Dept: PHYSICAL THERAPY | Facility: CLINIC | Age: 34
End: 2024-12-02
Payer: COMMERCIAL

## 2024-12-02 DIAGNOSIS — S86.012D RUPTURE OF LEFT ACHILLES TENDON, SUBSEQUENT ENCOUNTER: Primary | ICD-10-CM

## 2024-12-02 PROCEDURE — 97140 MANUAL THERAPY 1/> REGIONS: CPT

## 2024-12-02 PROCEDURE — 97112 NEUROMUSCULAR REEDUCATION: CPT

## 2024-12-02 PROCEDURE — 97110 THERAPEUTIC EXERCISES: CPT

## 2024-12-02 NOTE — PROGRESS NOTES
"Daily Note     Today's date: 2024  Patient name: Raghav Blair  : 1990  MRN: [id number]  Referring provider: Lachman, James R, MD  Dx:   Encounter Diagnosis     ICD-10-CM    1. Rupture of left Achilles tendon, subsequent encounter  S86.012D                      Subjective: Pt continues to ambulate with one wedge in the boot. Notes that he is doing well overall.       Objective: See treatment diary below      Assessment: Tolerated treatment well. Continued with current protocol this session Pt did well overall with good ankle strength. Patient demonstrated fatigue post treatment, exhibited good technique with therapeutic exercises, and would benefit from continued PT      Plan: Continue per plan of care.  Progress treament per protocol. He will be taking last wedge out tomorrow.      Precautions: S/P Left achilles repair DOS 10/24 Dr. Lachman      Manuals         PROM Left ankle Avoid passive DF  PF INV EV  PF INV EV     TM PF INV EV     RR PF INV EV     TM PF INV EV     MM                                               Neuro Re-Ed             Ankle isometrics HEP 5\" x10 ea 5''20x  5\" x20 5\"x20        Ankle ABC HEP  x1 x1 x1 x1        Ankle AROM all planes  HEP  X20 ea x20 x20 x20        Baps Board   X20 ea dir X20 each  X20 ea dir X20 ea dir        NU step in CAM boot              Upright bike for endurance (in CAM boot)   L4 x10 min L4 x10 min L4 x10 min L12 x10 min        Toe curls   X2 min X2 min X2 min X2 min        Towel IV/EV  X2 min ea X2 min  X2 min  X2 min        Seated HR  x20 X20  x20 x20        Ther Ex             SLR x4 dir  X20 ea dir X20 each dir  X20 each dir  X30 each dir                                                                                                    Ther Activity                                       Gait Training                                       Modalities                                                "

## 2024-12-04 ENCOUNTER — OFFICE VISIT (OUTPATIENT)
Dept: PHYSICAL THERAPY | Facility: CLINIC | Age: 34
End: 2024-12-04
Payer: COMMERCIAL

## 2024-12-04 ENCOUNTER — OFFICE VISIT (OUTPATIENT)
Dept: OBGYN CLINIC | Facility: CLINIC | Age: 34
End: 2024-12-04

## 2024-12-04 VITALS — HEIGHT: 76 IN | BODY MASS INDEX: 29.83 KG/M2 | WEIGHT: 245 LBS

## 2024-12-04 DIAGNOSIS — S86.012D RUPTURE OF LEFT ACHILLES TENDON, SUBSEQUENT ENCOUNTER: Primary | ICD-10-CM

## 2024-12-04 DIAGNOSIS — S86.012A ACHILLES TENDON RUPTURE, LEFT, INITIAL ENCOUNTER: Primary | ICD-10-CM

## 2024-12-04 PROCEDURE — 97112 NEUROMUSCULAR REEDUCATION: CPT | Performed by: PHYSICAL THERAPIST

## 2024-12-04 PROCEDURE — 99024 POSTOP FOLLOW-UP VISIT: CPT | Performed by: ORTHOPAEDIC SURGERY

## 2024-12-04 PROCEDURE — 97140 MANUAL THERAPY 1/> REGIONS: CPT | Performed by: PHYSICAL THERAPIST

## 2024-12-04 PROCEDURE — 97110 THERAPEUTIC EXERCISES: CPT | Performed by: PHYSICAL THERAPIST

## 2024-12-04 NOTE — PATIENT INSTRUCTIONS
You will spend two more weeks in the CAM boot and will be weightbearing as tolerated (Until 12/18).    You may then begin weaning your boot and transitioning to a sneaker (on 12/18). It is important to do this gradually to avoid aggravating the healing process.    12/18, you may come out of the boot into a sneaker for 2 hours.  2. 12/19, you may come out of the boot into a sneaker for 4 hours,  3. The next day, you may come out of the boot into a sneaker for 6 hours.  4. Continue this (adding 2 hours per day) as you tolerate. For example, if you do 6 hours out of the boot into a sneaker and your foot swells more than usual at night and it is difficult to control the discomfort, do not advance to 8 hours the next day, stay at 6 hours until you are able to tolerate it.    Elevation, Ice and tylenol and staying off of it at night will be important to aide in this transition out of the boot. Swelling and soreness are normal as you begin to do more with the injured leg.    ---------------------------    May discontinue xarelto/Lovenox or aspirin  You may shower but do not soak in a pool/tub/ocean for another 1 week.  Continue scar massage (pea sized amount of lotion massaging scar in a circular pattern for 5 minutes each day.)  Compression stocking (knee high 20-30mm Hg pressure) to be worn at all times while awake.  Continue PT as per protocol        Achilles Rupture - Rehab Protocol     OVERVIEW:  Week 0: Surgery  Avoid prolonged dependent position (keep foot elevated)  Week 3: CAM walker boot with 3 heel wedges, weightbearing as tolerated.  remove one wedge each week (so down to 2 wedges at week 5, 1 wedge at week 6, 0 wedges at week 7). Begin PT  Week 6: Removed final heel wedge today. Begin 2 week period of WBAT in CAM boot with foot flat to the ground. Continue PT as per protocol     Week 8: continue PT, transition to shoe with heel lift, wean off crutches  PT 2-3 times/week and home exercises daily  Progress with  PT over ~4 months  Week 12: wean off of shoe lift  Week 20 / 5 months: gentle jog  Week 24 / 6 months:  Gradual return to sports as tolerated     DETAILED PHYSICAL THERAPY PROTOCOL:  Initial Phase  Begin gentle ROM, edema control, and progress to gentle strengthening as tolerated.  No dorsiflexion past neutral.     Phase I: weeks 6 - 10  Goals:  Normalize gait  Progress range of motion  Normalize dorsiflexion, inversion, and eversion ankle strength 5/5  Treatment Recommendations:  Gait training, wean off crutches/cane when gait non-antalgic  Heel lift in shoe to assist non-apprehensive and normalized gait  AROM dorsiflexion/plantarflexion/inversion/eversion  Proprioception training  Isometrics/isotonics: inversion/eversion  Sitting heel rise  PREs plantarflexion/dorsiflexion with knee flexed to 90; after 2 weeks, progress to PREs with knee extended to 0  Leg press  Bike  Alphabet drawing  Retro treadmill  Forward step up program  Underwater treadmill system for gait training  Precautions:  Avoid passive heel cord stretching  Minimum Criteria for Advancement:  Normal gait pattern  Manual muscle grade test of 5/5 for dorsiflexion, inversion, and eversion     Phase II: weeks 10 - 20  Goals:  Restore full functional range of motion  Normalize plantarflexion strength 5/5  Normalize balance  Return to functional activities without pain  Ability to descend stairs  Treatment Recommendations:  Submaximal sport-specific skill development  Proprioception training: BAPS, prop board, foam rollers, trampoline, [employer]  Isometrics/isotonics: inversion/eversion  Isokinetic plantarflexion/dorsiflexion  Standing heel rise  Aggressive PREs plantarflexion  Progress proximal strengthening (PREs)  Amy Garcia Versaclimber  Forward step down program  Running in underwater treadmill system  Precautions:  Avoid pain with therapeutic exercise and functional activities  Avoid high loading the Achilles tendon (i.e. aggressive  stretching in dorsiflexion with body weight or jumping)  Minimum Criteria for Advancement:  No apprehension with activities of daily living  Normal flexibility  Adequate strength base shown by ability to perform ten unilateral heel raises  Ability to descend stairs reciprocally  Symmetrical lower extremity balance     Phase III: weeks 20 - 28  Goals:  Demonstrate ability to run forward on a treadmill symptom-free  Average peak torque of 75% with isokinetic testing  Maximize strength and flexibility as to meet all demands of ADLs  Return to functional activity without limitation  Higher level of dynamic activity with lack of apprehension with sport-specific movements  Treatment Recommendations:  Start forward treadmill running  Isokinetic testing and training  Continue lower extremity strengthening and flexibility program  Advance proprioception training with perturbation  Light plyometric training (bilateral jumping activities)  Continue aggressive plantarflexion PREs (emphasize eccentric activity)  Submaximal sport specific skill development drills  Progress Amy hoffman Versaclimber  Continue to progress proximal strengthening of lower extremities (PREs)  Precautions:  No apprehension or pain with dynamic activity  Avoid running or sport activity until adequate strength and flexibility is achieved  Minimum Criteria for Advancement:  Pain free running  Average peak torque of isokinetic test = 75% of non-involved  Normal strength (5/5 throughout ankle)  Sports-specific drills with zero apprehension     Phase IV: Return to Sport (weeks 28 - one year)  Goals:  Lack of apprehension with sports activity  Maximize strength and flexibility as to meet demands of individual's sport activity  Treatment Recommendations:  Advanced functional exercises and agility exercises  Plyometrics  Sport-specific exercises  Isokinetic testing  Functional test assessment (such as vertical jump test)  Precautions:  Avoid pain with  therapeutic, functional, and sport activity  Avoid full sport activity until adequate strength and flexibility  Criteria for Discharge:  Flexibility and strength to accepted levels for sports performance  Lack of apprehension with sport-specific movements  85% limb symmetry with vertical jump test  85% limb symmetry for average peak isokinetic torque (PF/DF/inv/ev)  Independent performance of gym/home exercise program

## 2024-12-04 NOTE — PROGRESS NOTES
James R Lachman, M.D.  Attending, Orthopaedic Surgery  Foot and Ankle  [facility]      ORTHOPAEDIC FOOT AND ANKLE POST-OP VISIT     Procedure:      Left Achilles Tendon Repair       Date of surgery:   10/24/24      PLAN  1. Weightbearing Status - WBAT operative extremity  2. DVT prophylaxis - Completed  3. Continue PT/HEP as directed  4. Pain control - OTC pain medication  5. RTC in 6 weeks  6. Xrays needed next visit - No    History of Present Illness:   Chief Complaint:   Chief Complaint   Patient presents with    Post-op     Post op 6 weeks. In cam boot and walking. Everything going well.   Repair Tendon Achilles - Left       Raghav Blair is a 34 y.o. male who is being seen for 6 week post-operative visit for the above procedure. Pain is well controlled and the patient has successfully transitioned to OTC pain medicines.  he is taking Xarelto 10mg Daily for DVT prophylaxis. Patient has been WBAT in a CAM boot      Review of Systems:  General- denies fever/chills  Respiratory- denies cough or SOB  Cardio- denies chest pain or palpitations  GI- denies abdominal pain  Musculoskeletal- Negative except noted above  Skin- denies rashes or wounds    Physical Exam:   There were no vitals taken for this visit.  General/Constitutional: No apparent distress: well-nourished and well developed.  Eyes: normal ocular motion  Lymphatic: No appreciable lymphadenopathy  Respiratory: Non-labored breathing  Vascular: No edema, swelling or tenderness, except as noted in detailed exam.  Integumentary: No impressive skin lesions present, except as noted in detailed exam.  Neuro: No ataxia or tremors noted  Psych: Normal mood and affect, oriented to person, place and time. Appropriate affect.  Musculoskeletal: Normal, except as noted in detailed exam and in HPI.    Examination    left        Incision Clean, dry, intact  Sutures Previously removed.    Ecchymosis none    Swelling mild    Sensation  Intact to light touch throughout sural, saphenous, superficial peroneal, deep peroneal and medial/lateral plantar nerve distributions.  Goodrich-Maryann 5.07 filament (10g) testing deferred.    Cardiovascular Brisk capillary refill < 2 seconds,intact DP and PT pulses    Special Tests None      Imaging Studies:   No new images    Scribe Attestation      I,:  Huy Urbina am acting as a scribe while in the presence of the attending physician.:       I,:  James R Lachman, MD personally performed the services described in this documentation    as scribed in my presence.:                James R. Lachman, MD  Foot & Ankle Surgery   Department of Orthopaedic Surgery  [facility]      I personally performed the service.    James R. Lachman, MD

## 2024-12-04 NOTE — PROGRESS NOTES
"Daily Note     Today's date: 2024  Patient name: Raghav Blair  : 1990  MRN: [id number]  Referring provider: Lachman, James R, MD  Dx:   Encounter Diagnosis     ICD-10-CM    1. Rupture of left Achilles tendon, subsequent encounter  S86.012D                      Subjective: Pt reports he saw MD today for follow up. Will wear cam boot for 2 more weeks then begin transition to sneaker at PT. We will continue to follow protocol. He is anxious to get out of his boot.        Objective: See treatment diary below      Assessment: Tolerated treatment well. Patient exhibited good technique with therapeutic exercises and would benefit from continued PT. Added multiple exercises this date to help increase LLE strength, Good tolerance to progression. Advised the patient to remain in his boot at all times, as per MD and protocol. He states he has been getting up at night without it. Will begin to transition to sneaker on . The patient is an agreement with the plan.      Plan: Continue per plan of care.      Precautions: S/P Left achilles repair DOS 10/24 Dr. Lachman      Manuals         PROM Left ankle Avoid passive DF  PF INV EV  PF INV EV     TM PF INV EV     RR PF INV EV     TM PF INV EV     MM PF INV EV     RR                                              Neuro Re-Ed             Ankle isometrics HEP 5\" x10 ea 5''20x  5\" x20 5\"x20 5''20x       Ankle ABC HEP  x1 x1 x1 x1 x1       Ankle AROM all planes  HEP  X20 ea x20 x20 x20 x20       Baps Board   X20 ea dir X20 each  X20 ea dir X20 ea dir x20       NU step in CAM boot              Upright bike for endurance (in CAM boot)   L4 x10 min L4 x10 min L4 x10 min L12 x10 min L12 x 10 min        Toe curls   X2 min X2 min X2 min X2 min --       Towel IV/EV  X2 min ea X2 min  X2 min  X2 min --       Seated HR  x20 X20  x20 x20        Ther Ex             SLR x4 dir  X20 ea dir X20 each dir  X20 each dir  X30 each dir         Leg " press        3x10 reps        Leg Ext Leg Curl        3x10                                                                        Ther Activity                                       Gait Training                                       Modalities

## 2024-12-09 ENCOUNTER — OFFICE VISIT (OUTPATIENT)
Dept: PHYSICAL THERAPY | Facility: CLINIC | Age: 34
End: 2024-12-09
Payer: COMMERCIAL

## 2024-12-09 DIAGNOSIS — S86.012D RUPTURE OF LEFT ACHILLES TENDON, SUBSEQUENT ENCOUNTER: Primary | ICD-10-CM

## 2024-12-09 PROCEDURE — 97110 THERAPEUTIC EXERCISES: CPT

## 2024-12-09 PROCEDURE — 97112 NEUROMUSCULAR REEDUCATION: CPT

## 2024-12-09 PROCEDURE — 97140 MANUAL THERAPY 1/> REGIONS: CPT

## 2024-12-09 NOTE — PROGRESS NOTES
"Daily Note     Today's date: 2024  Patient name: Raghav Blair  : 1990  MRN: [id number]  Referring provider: Lachman, James R, MD  Dx:   Encounter Diagnosis     ICD-10-CM    1. Rupture of left Achilles tendon, subsequent encounter  S86.012D                      Subjective: Pt has no c/o prior to exercise today.       Objective: See treatment diary below      Assessment: Tolerated treatment well. Reviewed importance of following protocol for tissue healing.  Patient demonstrated fatigue post treatment, exhibited good technique with therapeutic exercises, and would benefit from continued PT as per MD protocol.       Plan: Continue per plan of care.      Precautions: S/P Left achilles repair DOS 10/24 Dr. Lachman. Pt may begin weaning into sneaker with one heel lift  as per Dr Lachman protocol.      Manuals       PROM Left ankle Avoid passive DF  PF INV EV  PF INV EV     TM PF INV EV     RR PF INV EV     TM PF INV EV     MM PF INV EV     RR PF INV EV     MJC                                             Neuro Re-Ed             Ankle isometrics HEP 5\" x10 ea 5''20x  5\" x20 5\"x20 5''20x 5\" x20      Ankle ABC HEP  x1 x1 x1 x1 x1 x1      Ankle AROM all planes  HEP  X20 ea x20 x20 x20 x20 x20      Baps Board   X20 ea dir X20 each  X20 ea dir X20 ea dir x20 X20 ea      NU step in CAM boot              Upright bike for endurance (in CAM boot)   L4 x10 min L4 x10 min L4 x10 min L12 x10 min L12 x 10 min  L 12 x10 min      Toe curls   X2 min X2 min X2 min X2 min --       Towel IV/EV  X2 min ea X2 min  X2 min  X2 min --       Seated HR  x20 X20  x20 x20  x20      Ther Ex             SLR x4 dir  X20 ea dir X20 each dir  X20 each dir  X30 each dir         Leg press       #  3x10 reps  #  3x10 reps       Leg Ext Leg Curl        #  3x10 SL   110#  3x10                                                                       Ther Activity                    "                    Gait Training                                       Modalities

## 2024-12-11 ENCOUNTER — OFFICE VISIT (OUTPATIENT)
Dept: PHYSICAL THERAPY | Facility: CLINIC | Age: 34
End: 2024-12-11
Payer: COMMERCIAL

## 2024-12-11 DIAGNOSIS — S86.012D RUPTURE OF LEFT ACHILLES TENDON, SUBSEQUENT ENCOUNTER: Primary | ICD-10-CM

## 2024-12-11 PROCEDURE — 97110 THERAPEUTIC EXERCISES: CPT

## 2024-12-11 PROCEDURE — 97112 NEUROMUSCULAR REEDUCATION: CPT

## 2024-12-11 PROCEDURE — 97140 MANUAL THERAPY 1/> REGIONS: CPT

## 2024-12-11 NOTE — PROGRESS NOTES
"Daily Note     Today's date: 2024  Patient name: Raghav Blair  : 1990  MRN: [id number]  Referring provider: Lachman, James R, MD  Dx:   Encounter Diagnosis     ICD-10-CM    1. Rupture of left Achilles tendon, subsequent encounter  S86.012D                      Subjective: Pt is anxious to DC his CAM boot in 1 week. He requested to perform the leg press using B/L LE, as he feels LE strength imbalance, which is causing minor LBP.       Objective: See treatment diary below      Assessment: Tolerated treatment well. Patient demonstrated fatigue post treatment, exhibited good technique with therapeutic exercises, and would benefit from continued PT      Plan: Continue per plan of care.  Progress treament per protocol.      Precautions: S/P Left achilles repair DOS 10/24 Dr. Lachman. Pt may begin weaning into sneaker with one heel lift  as per Dr Lachman protocol.      Manuals      PROM Left ankle Avoid passive DF  PF INV EV  PF INV EV     TM PF INV EV     RR PF INV EV     TM PF INV EV     MM PF INV EV     RR PF INV EV     MJC PF INV EV     TM                                            Neuro Re-Ed             Ankle isometrics HEP 5\" x10 ea 5''20x  5\" x20 5\"x20 5''20x 5\" x20 5\" x20     Ankle ABC HEP  x1 x1 x1 x1 x1 x1 x1     Ankle AROM all planes  HEP  X20 ea x20 x20 x20 x20 x20 x20     Baps Board   X20 ea dir X20 each  X20 ea dir X20 ea dir x20 X20 ea X20 ea     NU step in CAM boot              Upright bike for endurance (in CAM boot)   L4 x10 min L4 x10 min L4 x10 min L12 x10 min L12 x 10 min  L 12 x10 min L12 x 10 min      Toe curls   X2 min X2 min X2 min X2 min --       Towel IV/EV  X2 min ea X2 min  X2 min  X2 min --       Seated HR  x20 X20  x20 x20  x20 x20     Ther Ex             SLR x4 dir  X20 ea dir X20 each dir  X20 each dir  X30 each dir         Leg press in CAM boot      #  3x10 reps  #  3x10 reps  B/L 200# x10 300# " x10  340# x10      Leg Ext/Leg Curl        #  3x10 SL   110#  3x10                                                                       Ther Activity                                       Gait Training                                       Modalities

## 2024-12-16 ENCOUNTER — OFFICE VISIT (OUTPATIENT)
Dept: PHYSICAL THERAPY | Facility: CLINIC | Age: 34
End: 2024-12-16
Payer: COMMERCIAL

## 2024-12-16 DIAGNOSIS — S86.012D RUPTURE OF LEFT ACHILLES TENDON, SUBSEQUENT ENCOUNTER: Primary | ICD-10-CM

## 2024-12-16 PROCEDURE — 97140 MANUAL THERAPY 1/> REGIONS: CPT

## 2024-12-16 PROCEDURE — 97110 THERAPEUTIC EXERCISES: CPT | Performed by: PHYSICAL THERAPIST

## 2024-12-16 PROCEDURE — 97112 NEUROMUSCULAR REEDUCATION: CPT

## 2024-12-16 NOTE — PROGRESS NOTES
"Daily Note     Today's date: 2024  Patient name: Raghav Blair  : 1990  MRN: [id number]  Referring provider: Lachman, James R, MD  Dx:   Encounter Diagnosis     ICD-10-CM    1. Rupture of left Achilles tendon, subsequent encounter  S86.012D                      Subjective: Pt is anxious to DC his CAM boot in Wednesday, 24. He denied all pain prior to PT.       Objective: See treatment diary below      Assessment: Tolerated treatment well. Patient demonstrated fatigue post treatment, exhibited good technique with therapeutic exercises, and would benefit from continued PT      Plan: Continue per plan of care.  Progress note during next visit.  Progress treament per protocol.      Precautions: S/P Left achilles repair DOS 10/24 Dr. Lachman. Pt may begin weaning into sneaker with one heel lift  as per Dr Lachman protocol.      Manuals     PROM Left ankle Avoid passive DF  PF INV EV  PF INV EV     TM PF INV EV     RR PF INV EV     TM PF INV EV     MM PF INV EV     RR PF INV EV     MJC PF INV EV     TM PF INV EV     TM                                           Neuro Re-Ed             Ankle isometrics HEP 5\" x10 ea 5''20x  5\" x20 5\"x20 5''20x 5\" x20 5\" x20 5\" x20    Ankle ABC HEP  x1 x1 x1 x1 x1 x1 x1 x1    Ankle AROM all planes  HEP  X20 ea x20 x20 x20 x20 x20 x20 x20    Baps Board   X20 ea dir X20 each  X20 ea dir X20 ea dir x20 X20 ea X20 ea X20 ea    NU step in CAM boot              Upright bike for endurance (in CAM boot)   L4 x10 min L4 x10 min L4 x10 min L12 x10 min L12 x 10 min  L 12 x10 min L12 x 10 min  L14 x 10 min     Toe curls   X2 min X2 min X2 min X2 min --       Towel IV/EV  X2 min ea X2 min  X2 min  X2 min --       Seated HR  x20 X20  x20 x20  x20 x20 x20    Ther Ex             SLR x4 dir  X20 ea dir X20 each dir  X20 each dir  X30 each dir         Leg press in CAM boot      #  3x10 reps  #  3x10 reps  B/L 200# " x10 300# x10  340# x10  B/L 300# x10  340# 2x10     Leg Ext/Leg Curl        #  3x10 SL   110#  3x10 SL   110#  3x10 SL   110#  3x10                                                                     Ther Activity                                       Gait Training                                       Modalities

## 2024-12-18 ENCOUNTER — EVALUATION (OUTPATIENT)
Dept: PHYSICAL THERAPY | Facility: CLINIC | Age: 34
End: 2024-12-18
Payer: COMMERCIAL

## 2024-12-18 DIAGNOSIS — S86.012D RUPTURE OF LEFT ACHILLES TENDON, SUBSEQUENT ENCOUNTER: Primary | ICD-10-CM

## 2024-12-18 PROCEDURE — 97112 NEUROMUSCULAR REEDUCATION: CPT | Performed by: PHYSICAL THERAPIST

## 2024-12-18 PROCEDURE — 97140 MANUAL THERAPY 1/> REGIONS: CPT | Performed by: PHYSICAL THERAPIST

## 2024-12-18 PROCEDURE — 97110 THERAPEUTIC EXERCISES: CPT | Performed by: PHYSICAL THERAPIST

## 2024-12-18 NOTE — PROGRESS NOTES
PT Progress Note     Today's date: 2024  Patient name: Raghav Blair  : 1990  MRN: [id number]  Referring provider: Lachman, James R, MD  Dx:   Encounter Diagnosis     ICD-10-CM    1. Rupture of left Achilles tendon, subsequent encounter  S86.012D               Assessment  Impairments: abnormal coordination, abnormal gait, abnormal muscle firing, abnormal muscle tone, abnormal or restricted ROM, abnormal movement, activity intolerance, impaired physical strength, lacks appropriate home exercise program, pain with function, safety issue, weight-bearing intolerance and poor posture   Functional limitations: Difficulty with amb, stair negotiation, receational activities.  Symptom irritability: moderate     Assessment details: Raghav Blair is a 34 y.o. male who presents to outpatient PT with a  Rupture of left Achilles tendon, subsequent encounter.  No further referral appears necessary at this time based upon examination results. Pt presents with decreased strength, ROM, balance, functional activity tolerance, and pain with movement in his left foot /ankle which is  limiting his ability to perform the aforementioned functional activities.  Etiologic factors include repetitive poor body mechanics. Prognosis is good given HEP compliance and PT 2-3/wk.  Please contact me if you have any questions or recommendations.  Thank you for the opportunity to share in  [facility].     RA     Raghav Blair has demonstrated decreased pain, increased strength, increased range of motion, and increased activity tolerance since starting physical therapy services. He reports an overall improvement of 60% thus far. Progressing well per protocol. Updated POC this date. Transitioned to sneaker with heel wedge, without incident. He continues to present with pain, decreased strength, decreased range of motion, and decreased activity tolerance and would benefit from additional skilled physical  therapy interventions to address impairments and maximize function.       Understanding of Dx/Px/POC: good     Prognosis: good     Goals  STG to be achieved in 4 weeks      1. Pt will reduce subjective pain rating by at least 50 percent the help facilitate return to PLOF  Met   2. Pt will improve Left ankle AROM by at least 10 degrees to help promote improved functional activity tolerance   Met   3. Pt will improve Left ankle MMT scores by at least 1/2 grade to promote improved functional activity tolerance   Met      LTG to be achieved in 6-8 weeks   1. Pt will be complaint with HEP   Met   2. Pt will improve Left ankle AROM to WFL, to help facilitate independence with ADL's, IADL's, and functional activities   Progressing   3. Pt will improve Left ankle Strength to WFL to help facilitate independence with ADL's, IADL's, and functional activities   Progressing   4. Pt will have no limitations with ambulation to help facilitate independence at home and in the community.   Progressing   5. Pt will have no limitations with stair negotiation to help facilitate independence at home and in the community   Progressing               Plan  Patient would benefit from: skilled physical therapy     Planned therapy interventions: ADL training, balance, balance/weight bearing training, flexibility, functional ROM exercises, gait training, graded activity, graded exercise, graded motor, home exercise program, joint mobilization, manual therapy, neuromuscular re-education, patient education, postural training, strengthening, stretching, therapeutic activities and therapeutic exercise     Frequency: 2x week  Duration in weeks: 12  Plan of Care beginning date: 11/18/2024  Plan of Care expiration date: 2/14/2025  Treatment plan discussed with: patient, PTA and referring physician           Subjective Evaluation     History of Present Illness  Mechanism of injury: 34 year old male presents to outpatient PT S/P left achilles repair   "Lachman DOS 10/24  Doing well post operatively. He is ambulating with no AD this date FWB on LLE. He is currently wearing three achilles wedges. And will be taking one wedge out each week. Will follow Dr. Lachman Achilles repair protocol.    Patient Goals  Patient goals for therapy: increased strength and decreased pain  Patient goal: to get back to normal  Pain     RA   Current pain ratin   0  At best pain ratin   0     At worst pain ratin   0           Objective      RA      Active Range of Motion   Left Ankle/Foot   Dorsiflexion (ke): -35 degrees   0 deg   Plantar flexion: 50 degrees    50 deg  Inversion: 25 degrees    25 deg    Eversion: 5 degrees     5 deg      Additional Active Range of Motion Details  Strength NT this date     RA  4-/5 all planes      Surgical incision(s) inspected. Incision(s) clean, dry and intact with no signs/symptoms of infection. Patient was educated on signs/symptoms of infection and was advised to contact MD immediately if suspect infection.              Precautions: S/P Left achilles repair DOS 10/24 Dr. Lachman. Pt may begin weaning into sneaker with one heel lift  as per Dr Lachman protocol.      Manuals    PROM Left ankle Avoid passive DF  PF INV EV  PF INV EV     TM PF INV EV     RR PF INV EV     TM PF INV EV     MM PF INV EV     RR PF INV EV     MJC PF INV EV     TM PF INV EV     TM All dir to leo RR                                          Neuro Re-Ed             Ankle isometrics HEP 5\" x10 ea 5''20x  5\" x20 5\"x20 5''20x 5\" x20 5\" x20 5\" x20 TB 4 way band    Ankle ABC HEP  x1 x1 x1 x1 x1 x1 x1 x1 --   Ankle AROM all planes  HEP  X20 ea x20 x20 x20 x20 x20 x20 x20 --   Baps Board   X20 ea dir X20 each  X20 ea dir X20 ea dir x20 X20 ea X20 ea X20 ea --   NU step in CAM boot              Upright bike for endurance (in CAM boot)   L4 x10 min L4 x10 min L4 x10 min L12 x10 min L12 x 10 min  L 12 " x10 min L12 x 10 min  L14 x 10 min  L12 x 10 min sneaker    Toe curls   X2 min X2 min X2 min X2 min --       Towel IV/EV  X2 min ea X2 min  X2 min  X2 min --       Seated HR  x20 X20  x20 x20  x20 x20 x20 x20   Biodex           L12 LOS x 2 trials, Maze L12 x 2 trials    BOSU Lunge           5''20x   SLS Foam          30''3x                             Ther Ex             SLR x4 dir  X20 ea dir X20 each dir  X20 each dir  X30 each dir         Leg press in CAM boot      #  3x10 reps  #  3x10 reps  B/L 200# x10 300# x10  340# x10  B/L 300# x10  340# 2x10  SL 60# 2x10   2x10  HR TR 60# 2x10   Leg Ext/Leg Curl        #  3x10 SL   110#  3x10 SL   110#  3x10 SL   110#  3x10    Step up           Forward Lat 2x10                                                        Ther Activity                                       Gait Training                                       Modalities

## 2024-12-23 ENCOUNTER — OFFICE VISIT (OUTPATIENT)
Dept: PHYSICAL THERAPY | Facility: CLINIC | Age: 34
End: 2024-12-23
Payer: COMMERCIAL

## 2024-12-23 DIAGNOSIS — S86.012D RUPTURE OF LEFT ACHILLES TENDON, SUBSEQUENT ENCOUNTER: Primary | ICD-10-CM

## 2024-12-23 PROCEDURE — 97140 MANUAL THERAPY 1/> REGIONS: CPT | Performed by: PHYSICAL THERAPIST

## 2024-12-23 PROCEDURE — 97112 NEUROMUSCULAR REEDUCATION: CPT | Performed by: PHYSICAL THERAPIST

## 2024-12-23 PROCEDURE — 97110 THERAPEUTIC EXERCISES: CPT | Performed by: PHYSICAL THERAPIST

## 2024-12-23 NOTE — PROGRESS NOTES
"Daily Note     Today's date: 2024  Patient name: Raghav Blair  : 1990  MRN: [id number]  Referring provider: Lachman, James R, MD  Dx:   Encounter Diagnosis     ICD-10-CM    1. Rupture of left Achilles tendon, subsequent encounter  S86.012D                      Subjective: Pt reports he is ok today. Was mildly sore after his last session       Objective: See treatment diary below      Assessment: Tolerated treatment well. Patient exhibited good technique with therapeutic exercises and would benefit from continued PT      Plan: Continue per plan of care.      Precautions: S/P Left achilles repair DOS 10/24 Dr. Lachman. Pt may begin weaning into sneaker with one heel lift  as per Dr Lachman protocol.      Manuals    PROM Left ankle Avoid passive DF  All dir to leo RR PF INV EV     TM PF INV EV     RR PF INV EV     TM PF INV EV     MM PF INV EV     RR PF INV EV     MJC PF INV EV     TM PF INV EV     TM All dir to leo RR                                          Neuro Re-Ed             Ankle isometrics TB 4 way band  5\" x10 ea 5''20x  5\" x20 5\"x20 5''20x 5\" x20 5\" x20 5\" x20 TB 4 way band    Ankle ABC  x1 x1 x1 x1 x1 x1 x1 x1 --   Ankle AROM all planes   X20 ea x20 x20 x20 x20 x20 x20 x20 --   Baps Board   X20 ea dir X20 each  X20 ea dir X20 ea dir x20 X20 ea X20 ea X20 ea --   NU step in CAM boot              Upright bike for endurance (in CAM boot)  L12 x 10 min sneaker  L4 x10 min L4 x10 min L4 x10 min L12 x10 min L12 x 10 min  L 12 x10 min L12 x 10 min  L14 x 10 min  L12 x 10 min sneaker    Toe curls   X2 min X2 min X2 min X2 min --       Towel IV/EV  X2 min ea X2 min  X2 min  X2 min --       Seated HR x20 x20 X20  x20 x20  x20 x20 x20 x20   Biodex  L12 LOS x 2 trials, Maze L12 x 2 trials          L12 LOS x 2 trials, Maze L12 x 2 trials    BOSU Lunge  5''20x         5''20x   SLS Foam 30''3x         30''3x                           "   Ther Ex             SLR x4 dir  X20 ea dir X20 each dir  X20 each dir  X30 each dir         Leg press in CAM boot SL 80# 3x10   3x10  HR TR 80# 3x10     #  3x10 reps  #  3x10 reps  B/L 200# x10 300# x10  340# x10  B/L 300# x10  340# 2x10  SL 60# 2x10   2x10  HR TR 60# 2x10   Leg Ext/Leg Curl        #  3x10 SL   110#  3x10 SL   110#  3x10 SL   110#  3x10    Step up  Forward Lat 2x10          Forward Lat 2x10                                                        Ther Activity                                       Gait Training                                       Modalities

## 2024-12-26 ENCOUNTER — OFFICE VISIT (OUTPATIENT)
Dept: PHYSICAL THERAPY | Facility: CLINIC | Age: 34
End: 2024-12-26
Payer: COMMERCIAL

## 2024-12-26 DIAGNOSIS — S86.012D RUPTURE OF LEFT ACHILLES TENDON, SUBSEQUENT ENCOUNTER: Primary | ICD-10-CM

## 2024-12-26 PROCEDURE — 97112 NEUROMUSCULAR REEDUCATION: CPT

## 2024-12-26 PROCEDURE — 97110 THERAPEUTIC EXERCISES: CPT

## 2024-12-26 NOTE — PROGRESS NOTES
"Daily Note     Today's date: 2024  Patient name: Raghav Blair  : 1990  MRN: [id number]  Referring provider: Lachman, James R, MD  Dx:   Encounter Diagnosis     ICD-10-CM    1. Rupture of left Achilles tendon, subsequent encounter  S86.012D                      Subjective: Pt reports he does has some typical muscle soreness L achilles. Compliant with heel wedge in L sneaker.        Objective: See treatment diary below      Assessment: Tolerated treatment well. Did not progress secondary to recent progression with exercise. Patient demonstrated fatigue post treatment, exhibited good technique with therapeutic exercises, and would benefit from continued PT      Plan: Continue per plan of care.      Precautions: S/P Left achilles repair DOS 10/24 Dr. Lachman. Pt may begin weaning into sneaker with one heel lift  as per Dr Lachman protocol.      Manuals    PROM Left ankle Avoid passive DF  All dir to leo RR All dir   To leo  MJC PF INV EV     RR PF INV EV     TM PF INV EV     MM PF INV EV     RR PF INV EV     MJC PF INV EV     TM PF INV EV     TM All dir to leo RR                                          Neuro Re-Ed             Ankle isometrics TB 4 way band  Seated 4   Way GTB  X30 ea   5''20x  5\" x20 5\"x20 5''20x 5\" x20 5\" x20 5\" x20 TB 4 way band    Ankle ABC   x1 x1 x1 x1 x1 x1 x1 --   Ankle AROM all planes    x20 x20 x20 x20 x20 x20 x20 --   Baps Board    X20 each  X20 ea dir X20 ea dir x20 X20 ea X20 ea X20 ea --   NU step in CAM boot              Upright bike for endurance (in CAM boot)  L12 x 10 min sneaker  L12 x10 min in sneaker L4 x10 min L4 x10 min L12 x10 min L12 x 10 min  L 12 x10 min L12 x 10 min  L14 x 10 min  L12 x 10 min sneaker    Toe curls    X2 min X2 min X2 min --       Towel IV/EV   X2 min  X2 min  X2 min --       Seated HR x20 x20 X20  x20 x20  x20 x20 x20 x20   Biodex  L12 LOS x 2 trials, Maze L12 x 2 trials  " "L12  LOS med dif  X  2 trials, Maze L12 x 2 trials         L12 LOS x 2 trials, Maze L12 x 2 trials    BOSU Lunge  5''20x 5\" x20        5''20x   SLS Foam 30''3x 30\" x3        30''3x                             Ther Ex             SLR x4 dir   X20 each dir  X20 each dir  X30 each dir         Leg press in CAM boot SL 80# 3x10   3x10  HR TR 80# 3x10 SL  80#  3x10  DL  220#   3x10  HR TR  80#  3x10    #  3x10 reps  #  3x10 reps  B/L 200# x10 300# x10  340# x10  B/L 300# x10  340# 2x10  SL 60# 2x10   2x10  HR TR 60# 2x10   Leg Ext/Leg Curl        #  3x10 SL   110#  3x10 SL   110#  3x10 SL   110#  3x10    Step up  Forward Lat 2x10  Forward Lat 2x10         Forward Lat 2x10                                                        Ther Activity                                       Gait Training                                       Modalities                                                              "

## 2024-12-30 ENCOUNTER — OFFICE VISIT (OUTPATIENT)
Dept: PHYSICAL THERAPY | Facility: CLINIC | Age: 34
End: 2024-12-30

## 2024-12-30 DIAGNOSIS — S86.012D RUPTURE OF LEFT ACHILLES TENDON, SUBSEQUENT ENCOUNTER: Primary | ICD-10-CM

## 2024-12-30 PROCEDURE — 97112 NEUROMUSCULAR REEDUCATION: CPT

## 2024-12-30 PROCEDURE — 97140 MANUAL THERAPY 1/> REGIONS: CPT

## 2024-12-30 NOTE — PROGRESS NOTES
"Daily Note     Today's date: 2024  Patient name: Raghav Blair  : 1990  MRN: [id number]  Referring provider: Lachman, James R, MD  Dx:   Encounter Diagnosis     ICD-10-CM    1. Rupture of left Achilles tendon, subsequent encounter  S86.012D                      Subjective: Pt denied pain at rest. He continues to use one wedge in his L sneaker.       Objective: See treatment diary below      Assessment: Tolerated treatment well. Patient demonstrated fatigue post treatment, exhibited good technique with therapeutic exercises, and would benefit from continued PT      Plan: Continue per plan of care.  Progress treament per protocol.      Precautions: S/P Left achilles repair DOS 10/24 Dr. Lachman. Pt may begin weaning into sneaker with one heel lift  as per Dr Lachman protocol.      Manuals    PROM Left ankle Avoid passive DF  All dir to leo RR All dir   To leo  MJC All dir to leo TM PF INV EV     TM PF INV EV     MM PF INV EV     RR PF INV EV     MJC PF INV EV     TM PF INV EV     TM All dir to leo RR                                          Neuro Re-Ed             Ankle isometrics TB 4 way band  Seated 4   Way GTB  X30 ea   4   Way GTB  X30 ea  5\" x20 5\"x20 5''20x 5\" x20 5\" x20 5\" x20 TB 4 way band    Ankle ABC    x1 x1 x1 x1 x1 x1 --   Ankle AROM all planes     x20 x20 x20 x20 x20 x20 --   Baps Board     X20 ea dir X20 ea dir x20 X20 ea X20 ea X20 ea --   NU step in CAM boot              Upright bike for endurance (in CAM boot)  L12 x 10 min sneaker  L12 x10 min in sneaker L12 x10 min L4 x10 min L12 x10 min L12 x 10 min  L 12 x10 min L12 x 10 min  L14 x 10 min  L12 x 10 min sneaker    Toe curls     X2 min X2 min --       Towel IV/EV    X2 min  X2 min --       Seated HR x20 x20 X20  x20 x20  x20 x20 x20 x20   Biodex  L12 LOS x 2 trials, Maze L12 x 2 trials  L12  LOS med dif  X  2 trials, Maze L12 x 2 trials  L12  LOS med dif  X  2 " "trials, Maze L12 x 2 trials        L12 LOS x 2 trials, Maze L12 x 2 trials    BOSU Lunge  5''20x 5\" x20 Fwd/lat 5\" x20       5''20x   SLS Foam 30''3x 30\" x3 30\" x3       30''3x                             Ther Ex             SLR x4 dir    X20 each dir  X30 each dir         Leg press in CAM boot SL 80# 3x10   3x10  HR TR 80# 3x10 SL  80#  3x10  DL  220#   3x10  HR TR  80#  3x10 SL  90#  3x10  DL  230#   3x10  HR TR  90#  3x10   #  3x10 reps  #  3x10 reps  B/L 200# x10 300# x10  340# x10  B/L 300# x10  340# 2x10  SL 60# 2x10   2x10  HR TR 60# 2x10   Leg Ext/Leg Curl        #  3x10 SL   110#  3x10 SL   110#  3x10 SL   110#  3x10    Step up  Forward Lat 2x10  Forward Lat 2x10  Fwd/ Lat 8\" 3x10        Forward Lat 2x10    Step overs   Fwd/Retro 6\" x20                                                 Ther Activity                                       Gait Training                                       Modalities                                                                "

## 2025-01-02 ENCOUNTER — OFFICE VISIT (OUTPATIENT)
Dept: PHYSICAL THERAPY | Facility: CLINIC | Age: 35
End: 2025-01-02

## 2025-01-02 DIAGNOSIS — S86.012D RUPTURE OF LEFT ACHILLES TENDON, SUBSEQUENT ENCOUNTER: Primary | ICD-10-CM

## 2025-01-02 PROCEDURE — 97112 NEUROMUSCULAR REEDUCATION: CPT | Performed by: PHYSICAL THERAPIST

## 2025-01-02 PROCEDURE — 97140 MANUAL THERAPY 1/> REGIONS: CPT | Performed by: PHYSICAL THERAPIST

## 2025-01-02 NOTE — PROGRESS NOTES
"Daily Note     Today's date: 2025  Patient name: Raghav Blair  : 1990  MRN: [id number]  Referring provider: Lachman, James R, MD  Dx:   Encounter Diagnosis     ICD-10-CM    1. Rupture of left Achilles tendon, subsequent encounter  S86.012D                      Subjective: Pt reports left achilles is doing well. He offers no new complaints today       Objective: See treatment diary below      Assessment: Tolerated treatment well. Patient exhibited good technique with therapeutic exercises and would benefit from continued PT. Progressing well per MD protocol.       Plan: Continue per plan of care.      Precautions: S/P Left achilles repair DOS 10/24 Dr. Lachman. Pt may begin weaning into sneaker with one heel lift  as per Dr Lachman protocol.      Manuals    PROM Left ankle Avoid passive DF  All dir to leo RR All dir   To leo  MJC All dir to leo TM RR  Inv EV  PF INV EV     RR PF INV EV     RR PF INV EV     MJC PF INV EV     TM PF INV EV     TM All dir to leo RR                                          Neuro Re-Ed             Ankle isometrics TB 4 way band  Seated 4   Way GTB  X30 ea   4   Way GTB  X30 ea  4   Way GTB  X30 ea  5\"x20 5''20x 5\" x20 5\" x20 5\" x20 TB 4 way band    Ankle ABC     x1 x1 x1 x1 x1 --   Ankle AROM all planes      x20 x20 x20 x20 x20 --   Baps Board      X20 ea dir x20 X20 ea X20 ea X20 ea --   NU step in CAM boot              Upright bike for endurance (in CAM boot)  L12 x 10 min sneaker  L12 x10 min in sneaker L12 x10 min L4 x10 min L12 x10 min L12 x 10 min  L 12 x10 min L12 x 10 min  L14 x 10 min  L12 x 10 min sneaker    Toe curls      X2 min --       Towel IV/EV     X2 min --       Seated HR x20 x20 X20  x20 x20  x20 x20 x20 x20   Biodex  L12 LOS x 2 trials, Maze L12 x 2 trials  L12  LOS med dif  X  2 trials, Maze L12 x 2 trials  L12  LOS med dif  X  2 trials, Maze L12 x 2 trials  L12  LOS med dif  X  2 trials, " "Maze L12 x 2 trials       L12 LOS x 2 trials, Maze L12 x 2 trials    BOSU Lunge  5''20x 5\" x20 Fwd/lat 5\" x20 Fwd/lat 5\" x20      5''20x   SLS Foam 30''3x 30\" x3 30\" x3 30\" x3      30''3x                             Ther Ex             SLR x4 dir    X20 each dir  X30 each dir      5   Leg press in CAM boot SL 80# 3x10   3x10  HR TR 80# 3x10 SL  80#  3x10  DL  220#   3x10  HR TR  80#  3x10 SL  90#  3x10  DL  230#   3x10  HR TR  90#  3x10 SL  90#  3x10  DL  230#   3x10  HR TR  90#  3x10  #  3x10 reps  #  3x10 reps  B/L 200# x10 300# x10  340# x10  B/L 300# x10  340# 2x10  SL 60# 2x10   2x10  HR TR 60# 2x10   Leg Ext/Leg Curl        #  3x10 SL   110#  3x10 SL   110#  3x10 SL   110#  3x10    Step up  Forward Lat 2x10  Forward Lat 2x10  Fwd/ Lat 8\" 3x10  Fwd/ Lat 8\" 3x10       Forward Lat 2x10    Step overs   Fwd/Retro 6\" x20 Fwd/Retro 6\" x20                                                Ther Activity                                       Gait Training                                       Modalities                                                                  "

## 2025-01-06 ENCOUNTER — OFFICE VISIT (OUTPATIENT)
Dept: PHYSICAL THERAPY | Facility: CLINIC | Age: 35
End: 2025-01-06

## 2025-01-06 DIAGNOSIS — S86.012D RUPTURE OF LEFT ACHILLES TENDON, SUBSEQUENT ENCOUNTER: Primary | ICD-10-CM

## 2025-01-06 PROCEDURE — 97112 NEUROMUSCULAR REEDUCATION: CPT | Performed by: PHYSICAL THERAPIST

## 2025-01-06 PROCEDURE — 97140 MANUAL THERAPY 1/> REGIONS: CPT | Performed by: PHYSICAL THERAPIST

## 2025-01-06 NOTE — PROGRESS NOTES
"Daily Note     Today's date: 2025  Patient name: Raghav Blair  : 1990  MRN: [id number]  Referring provider: Lachman, James R, MD  Dx:   Encounter Diagnosis     ICD-10-CM    1. Rupture of left Achilles tendon, subsequent encounter  S86.012D                      Subjective: Pt doing well. He offers no New complaints. He has been slowly progressing on his own at home.       Objective: See treatment diary below      Assessment: Tolerated treatment well. Patient exhibited good technique with therapeutic exercises and would benefit from continued PT. Progressing well per MD protocol. No adverse effects post treatment. Educated patient on precautions and motions to avoid. Verbalized understanding       Plan: Continue per plan of care.      Precautions: S/P Left achilles repair DOS 10/24 Dr. Lachman. Pt may begin weaning into sneaker with one heel lift  as per Dr Lachman protocol.      Manuals    PROM Left ankle Avoid passive DF  All dir to leo RR All dir   To leo  MJC All dir to leo TM RR  Inv EV  RR to leo PF INV EV     RR PF INV EV     MJC PF INV EV     TM PF INV EV     TM All dir to leo RR                                          Neuro Re-Ed             Ankle isometrics TB 4 way band  Seated 4   Way GTB  X30 ea   4   Way GTB  X30 ea  4   Way GTB  X30 ea  -- 5''20x 5\" x20 5\" x20 5\" x20 TB 4 way band    Ankle ABC      x1 x1 x1 x1 --   Ankle AROM all planes       x20 x20 x20 x20 --   Baps Board       x20 X20 ea X20 ea X20 ea --   NU step in CAM boot              Upright bike for endurance (in CAM boot)  L12 x 10 min sneaker  L12 x10 min in sneaker L12 x10 min L4 x10 min L4 10 min L12 x 10 min  L 12 x10 min L12 x 10 min  L14 x 10 min  L12 x 10 min sneaker    Toe curls       --       Towel IV/EV      --       Seated HR x20 x20 X20  x20 x20  x20 x20 x20 x20   Biodex  L12 LOS x 2 trials, Maze L12 x 2 trials  L12  LOS med dif  X  2 trials, Maze L12 " "x 2 trials  L12  LOS med dif  X  2 trials, Maze L12 x 2 trials  L8  LOS med dif  X  2 trials, Maze L12 x 2 trials  L12  LOS med dif  X  2 trials, Maze L12 x 2 trials      L12 LOS x 2 trials, Maze L12 x 2 trials    BOSU Lunge  5''20x 5\" x20 Fwd/lat 5\" x20 Fwd/lat 5\" x20 Fwd/lat 5\" x20     5''20x   SLS Foam 30''3x 30\" x3 30\" x3 30\" x3 30\" x3     30''3x                             Ther Ex             SLR x4 dir    X20 each dir  X30 each dir      5   Leg press in CAM boot SL 80# 3x10   3x10  HR TR 80# 3x10 SL  80#  3x10  DL  220#   3x10  HR TR  80#  3x10 SL  90#  3x10  DL  230#   3x10  HR TR  90#  3x10 SL  90#  3x10  DL  230#   3x10  HR TR  90#  3x10 SL  90#  3x10  DL  230#   3x10  HR TR  90#  3x10 #  3x10 reps  #  3x10 reps  B/L 200# x10 300# x10  340# x10  B/L 300# x10  340# 2x10  SL 60# 2x10   2x10  HR TR 60# 2x10   Leg Ext/Leg Curl        #  3x10 SL   110#  3x10 SL   110#  3x10 SL   110#  3x10    Step up  Forward Lat 2x10  Forward Lat 2x10  Fwd/ Lat 8\" 3x10  Fwd/ Lat 8\" 3x10  Fwd/ Lat 8\" 3x10      Forward Lat 2x10    Step overs   Fwd/Retro 6\" x20 Fwd/Retro 6\" x20 Fwd/Retro 6\" x20                                               Ther Activity                                       Gait Training                                       Modalities                                                                    "

## 2025-01-09 ENCOUNTER — OFFICE VISIT (OUTPATIENT)
Dept: PHYSICAL THERAPY | Facility: CLINIC | Age: 35
End: 2025-01-09

## 2025-01-09 DIAGNOSIS — S86.012D RUPTURE OF LEFT ACHILLES TENDON, SUBSEQUENT ENCOUNTER: Primary | ICD-10-CM

## 2025-01-09 PROCEDURE — 97112 NEUROMUSCULAR REEDUCATION: CPT | Performed by: PHYSICAL THERAPIST

## 2025-01-09 PROCEDURE — 97140 MANUAL THERAPY 1/> REGIONS: CPT | Performed by: PHYSICAL THERAPIST

## 2025-01-09 NOTE — PROGRESS NOTES
"Daily Note     Today's date: 2025  Patient name: Raghav Blair  : 1990  MRN: [id number]  Referring provider: Lachman, James R, MD  Dx:   Encounter Diagnosis     ICD-10-CM    1. Rupture of left Achilles tendon, subsequent encounter  S86.012D                      Subjective: Pt reports he is doing well. Happy with his progress      Objective: See treatment diary below      Assessment: Tolerated treatment well. Patient exhibited good technique with therapeutic exercises and would benefit from continued PT      Plan: Continue per plan of care.      Precautions: S/P Left achilles repair DOS 10/24 Dr. Lachman. Pt may begin weaning into sneaker with one heel lift  as per Dr Lachman protocol.      Manuals    PROM Left ankle Avoid passive DF  All dir to leo RR All dir   To leo  MJC All dir to leo TM RR  Inv EV  RR to leo INV EV      PF INV EV     MJC PF INV EV     TM PF INV EV     TM All dir to leo RR                                          Neuro Re-Ed             Ankle isometrics TB 4 way band  Seated 4   Way GTB  X30 ea   4   Way GTB  X30 ea  4   Way GTB  X30 ea  -- 4 way Blue 30x  5\" x20 5\" x20 5\" x20 TB 4 way band    Ankle ABC       x1 x1 x1 --   Ankle AROM all planes        x20 x20 x20 --   Baps Board        X20 ea X20 ea X20 ea --   NU step in CAM boot              Upright bike for endurance (in CAM boot)  L12 x 10 min sneaker  L12 x10 min in sneaker L12 x10 min L4 x10 min L4 10 min L10 x 10 min  L 12 x10 min L12 x 10 min  L14 x 10 min  L12 x 10 min sneaker    Toe curls       --       Towel IV/EV      --       Seated HR x20 x20 X20  x20 x20 x20 x20 x20 x20 x20   Biodex  L12 LOS x 2 trials, Maze L12 x 2 trials  L12  LOS med dif  X  2 trials, Maze L12 x 2 trials  L12  LOS med dif  X  2 trials, Maze L12 x 2 trials  L8  LOS med dif  X  2 trials, Maze L12 x 2 trials  L12  LOS med dif  X  2 trials, Maze L12 x 2 trials  L12  LOS med dif  X  2 " "trials, Maze L12 x 2 trials     L12 LOS x 2 trials, Maze L12 x 2 trials    BOSU Lunge  5''20x 5\" x20 Fwd/lat 5\" x20 Fwd/lat 5\" x20 Fwd/lat 5\" x20 Fwd/lat 5\" x20    5''20x   SLS Foam 30''3x 30\" x3 30\" x3 30\" x3 30\" x3 30\" x3    30''3x                             Ther Ex             SLR x4 dir    X20 each dir  X30 each dir      5   Leg press in CAM boot SL 80# 3x10   3x10  HR TR 80# 3x10 SL  80#  3x10  DL  220#   3x10  HR TR  80#  3x10 SL  90#  3x10  DL  230#   3x10  HR TR  90#  3x10 SL  90#  3x10  DL  230#   3x10  HR TR  90#  3x10 SL  90#  3x10  DL  230#   3x10  HR TR  90#  3x10  SL  90#  3x10  DL  230#   3x10  HR TR  90#  3x10 #  3x10 reps  B/L 200# x10 300# x10  340# x10  B/L 300# x10  340# 2x10  SL 60# 2x10   2x10  HR TR 60# 2x10   Leg Ext/Leg Curl        SL   110#  3x10 SL   110#  3x10 SL   110#  3x10    Step up  Forward Lat 2x10  Forward Lat 2x10  Fwd/ Lat 8\" 3x10  Fwd/ Lat 8\" 3x10  Fwd/ Lat 8\" 3x10  Fwd/ Lat 8\" 3x10    Forward Lat 2x10    Step overs   Fwd/Retro 6\" x20 Fwd/Retro 6\" x20 Fwd/Retro 6\" x20 Fwd/Retro 6\" x20                                              Ther Activity                                       Gait Training                                       Modalities                                                                      "

## 2025-01-13 ENCOUNTER — OFFICE VISIT (OUTPATIENT)
Dept: PHYSICAL THERAPY | Facility: CLINIC | Age: 35
End: 2025-01-13

## 2025-01-13 DIAGNOSIS — S86.012D RUPTURE OF LEFT ACHILLES TENDON, SUBSEQUENT ENCOUNTER: Primary | ICD-10-CM

## 2025-01-13 PROCEDURE — 97140 MANUAL THERAPY 1/> REGIONS: CPT

## 2025-01-13 PROCEDURE — 97112 NEUROMUSCULAR REEDUCATION: CPT

## 2025-01-13 NOTE — PROGRESS NOTES
"Daily Note     Today's date: 2025  Patient name: Raghav Blair  : 1990  MRN: [id number]  Referring provider: Lachman, James R, MD  Dx:   Encounter Diagnosis     ICD-10-CM    1. Rupture of left Achilles tendon, subsequent encounter  S86.012D                      Subjective: Pt denied all pain at rest. He is anxious to have his f/u with ortho tomorrow. He feel ready to DC he heel wedges.       Objective: See treatment diary below      Assessment: Initiated exercises on the elliptical to increase endurance and ROM of the LLE. Tolerated treatment well. Patient demonstrated fatigue post treatment, exhibited good technique with therapeutic exercises, and would benefit from continued PT      Plan: Continue per plan of care.  Progress treament per protocol.      Precautions: S/P Left achilles repair DOS 10/24 Dr. Lachman. Pt may begin weaning into sneaker with one heel lift  as per Dr Lachman protocol.      Manuals    PROM Left ankle Avoid passive DF  All dir to leo RR All dir   To leo  MJC All dir to leo TM RR  Inv EV  RR to leo INV EV      All dir to leo TM  PF INV EV     TM All dir to leo RR                                          Neuro Re-Ed             Ankle isometrics TB 4 way band  Seated 4   Way GTB  X30 ea   4   Way GTB  X30 ea  4   Way GTB  X30 ea  -- 4 way Blue 30x  --  5\" x20 TB 4 way band    Ankle ABC         x1 --   Ankle AROM all planes          x20 --   Baps Board          X20 ea --   NU step in CAM boot              Upright bike for endurance   L12 x 10 min sneaker  L12 x10 min in sneaker L12 x10 min L4 x10 min L4 10 min L10 x 10 min  L12 x10 min  L14 x 10 min  L12 x 10 min sneaker    Elliptical for LE endurance and ankle ROM       L5 x5 min      Toe curls       --       Towel IV/EV      --       Seated HR x20 x20 X20  x20 x20 x20   x20 x20   Biodex  L12 LOS x 2 trials, Maze L12 x 2 trials  L12  LOS med dif  X  2 trials, Maze L12 x 2 " "trials  L12  LOS med dif  X  2 trials, Maze L12 x 2 trials  L8  LOS med dif  X  2 trials, Maze L12 x 2 trials  L12  LOS med dif  X  2 trials, Maze L12 x 2 trials  L12  LOS med dif  X  2 trials, Maze L12 x 2 trials  L8  LOS Hard dif  X  2 trials, Maze L8 x 2 trials    L12 LOS x 2 trials, Maze L12 x 2 trials    BOSU Lunge  5''20x 5\" x20 Fwd/lat 5\" x20 Fwd/lat 5\" x20 Fwd/lat 5\" x20 Fwd/lat 5\" x20 Fwd/lat 5\" x20   5''20x   SLS Foam 30''3x 30\" x3 30\" x3 30\" x3 30\" x3 30\" x3 30\" x3   30''3x   SLS on trampoline w/ pivot and med ball toss       Yellow x10 tosses ea dir                   Ther Ex             SLR x4 dir    X20 each dir  X30 each dir      5   Leg press in CAM boot SL 80# 3x10   3x10  HR TR 80# 3x10 SL  80#  3x10  DL  220#   3x10  HR TR  80#  3x10 SL  90#  3x10  DL  230#   3x10  HR TR  90#  3x10 SL  90#  3x10  DL  230#   3x10  HR TR  90#  3x10 SL  90#  3x10  DL  230#   3x10  HR TR  90#  3x10  SL  90#  3x10  DL  230#   3x10  HR TR  90#  3x10 SL  130#  3x10  DL  270#   3x10  HR TR  130#  3x10  B/L 300# x10  340# 2x10  SL 60# 2x10   2x10  HR TR 60# 2x10   Leg Ext/Leg Curl          SL   110#  3x10    Step up  Forward Lat 2x10  Forward Lat 2x10  Fwd/ Lat 8\" 3x10  Fwd/ Lat 8\" 3x10  Fwd/ Lat 8\" 3x10  Fwd/ Lat 8\" 3x10 Lat 8\" x30   Forward Lat 2x10    Step overs   Fwd/Retro 6\" x20 Fwd/Retro 6\" x20 Fwd/Retro 6\" x20 Fwd/Retro 6\" x20 Fwd/Retro 8\" x20                                             Ther Activity                                       Gait Training                                       Modalities                                                                        "

## 2025-01-14 ENCOUNTER — OFFICE VISIT (OUTPATIENT)
Dept: OBGYN CLINIC | Facility: CLINIC | Age: 35
End: 2025-01-14

## 2025-01-14 VITALS — BODY MASS INDEX: 29.83 KG/M2 | WEIGHT: 245 LBS | HEIGHT: 76 IN

## 2025-01-14 DIAGNOSIS — S86.012D RUPTURE OF LEFT ACHILLES TENDON, SUBSEQUENT ENCOUNTER: Primary | ICD-10-CM

## 2025-01-14 PROCEDURE — 99024 POSTOP FOLLOW-UP VISIT: CPT | Performed by: ORTHOPAEDIC SURGERY

## 2025-01-14 NOTE — PATIENT INSTRUCTIONS
Achilles Rupture - Rehab Protocol     OVERVIEW:  Week 0: Surgery  Avoid prolonged dependent position (keep foot elevated)  Week 3: CAM walker boot with 3 heel wedges, weightbearing as tolerated.  remove one wedge each week (so down to 2 wedges at week 5, 1 wedge at week 6, 0 wedges at week 7). Begin PT     Week 8: continue PT, transition to shoe with heel lift, wean off crutches  PT 2-3 times/week and home exercises daily  Progress with PT over ~4 months  Week 12: wean off of shoe lift  Week 20 / 5 months: gentle jog  Week 24 / 6 months:  Gradual return to sports as tolerated     DETAILED PHYSICAL THERAPY PROTOCOL:  Initial Phase  Begin gentle ROM, edema control, and progress to gentle strengthening as tolerated.  No dorsiflexion past neutral.     Phase I: weeks 6- 10  Goals:  Normalize gait  Progress range of motion  Normalize dorsiflexion, inversion, and eversion ankle strength 5/5  Treatment Recommendations:  Gait training, wean off crutches/cane when gait non-antalgic  Heel lift in shoe to assist non-apprehensive and normalized gait  AROM dorsiflexion/plantarflexion/inversion/eversion  Proprioception training  Isometrics/isotonics: inversion/eversion  Sitting heel rise  PREs plantarflexion/dorsiflexion with knee flexed to 90; after 2 weeks, progress to PREs with knee extended to 0  Leg press  Bike  Alphabet drawing  Retro treadmill  Forward step up program  Underwater treadmill system for gait training  Precautions:  Avoid passive heel cord stretching  Minimum Criteria for Advancement:  Normal gait pattern  Manual muscle grade test of 5/5 for dorsiflexion, inversion, and eversion     Phase II: weeks 10 - 20  Goals:  Restore full functional range of motion  Normalize plantarflexion strength 5/5  Normalize balance  Return to functional activities without pain  Ability to descend stairs  Treatment Recommendations:  Submaximal sport-specific skill development  Proprioception training: BAPS, prop board, foam rollers,  Saul hernandez  Isometrics/isotonics: inversion/eversion  Isokinetic plantarflexion/dorsiflexion  Standing heel rise  Aggressive PREs plantarflexion  Progress proximal strengthening (PREs)  Amy Hoffman Versaclimber  Forward step down program  Running in underwater treadmill system  Precautions:  Avoid pain with therapeutic exercise and functional activities  Avoid high loading the Achilles tendon (i.e. aggressive stretching in dorsiflexion with body weight or jumping)  Minimum Criteria for Advancement:  No apprehension with activities of daily living  Normal flexibility  Adequate strength base shown by ability to perform ten unilateral heel raises  Ability to descend stairs reciprocally  Symmetrical lower extremity balance     Phase III: weeks 20 - 28  Goals:  Demonstrate ability to run forward on a treadmill symptom-free  Average peak torque of 75% with isokinetic testing  Maximize strength and flexibility as to meet all demands of ADLs  Return to functional activity without limitation  Higher level of dynamic activity with lack of apprehension with sport-specific movements  Treatment Recommendations:  Start forward treadmill running  Isokinetic testing and training  Continue lower extremity strengthening and flexibility program  Advance proprioception training with perturbation  Light plyometric training (bilateral jumping activities)  Continue aggressive plantarflexion PREs (emphasize eccentric activity)  Submaximal sport specific skill development drills  Progress Amy hoffman Versaclimber  Continue to progress proximal strengthening of lower extremities (PREs)  Precautions:  No apprehension or pain with dynamic activity  Avoid running or sport activity until adequate strength and flexibility is achieved  Minimum Criteria for Advancement:  Pain free running  Average peak torque of isokinetic test = 75% of non-involved  Normal strength (5/5 throughout ankle)  Sports-specific drills with zero  apprehension     Phase IV: Return to Sport (weeks 28 - one year)  Goals:  Lack of apprehension with sports activity  Maximize strength and flexibility as to meet demands of individual's sport activity  Treatment Recommendations:  Advanced functional exercises and agility exercises  Plyometrics  Sport-specific exercises  Isokinetic testing  Functional test assessment (such as vertical jump test)  Precautions:  Avoid pain with therapeutic, functional, and sport activity  Avoid full sport activity until adequate strength and flexibility  Criteria for Discharge:  Flexibility and strength to accepted levels for sports performance  Lack of apprehension with sport-specific movements  85% limb symmetry with vertical jump test  85% limb symmetry for average peak isokinetic torque (PF/DF/inv/ev)  Independent performance of gym/home exercise program

## 2025-01-14 NOTE — PROGRESS NOTES
"      James R Lachman, M.D.  Attending, Orthopaedic Surgery  Foot and Ankle  [facility]      ORTHOPAEDIC FOOT AND ANKLE POST-OP VISIT     Procedure:      Left Achilles Tendon Repair       Date of surgery:   10/24/24      PLAN  1. Weightbearing Status - WBAT operative extremity in supportive sneaker without heel wedges  2. DVT prophylaxis - Completed  3. Continue PT/HEP as directed  4. Pain control - OTC pain medication  5. RTC in 3 months  6. Xrays needed next visit - No    History of Present Illness:   Chief Complaint:   Chief Complaint   Patient presents with    Post-op     Post op 3 months. Everything going well. Stiff.   Repair Tendon Achilles - Left       Raghav Blair is a 34 y.o. male who is being seen for 3 month post-operative visit for the above procedure. Pain is well controlled and the patient has successfully transitioned to OTC pain medicines.  he has completed Xarelto 10mg Daily for DVT prophylaxis. Patient has been WBAT in a Supportive sneaker with a heel wedge      Review of Systems:  General- denies fever/chills  Respiratory- denies cough or SOB  Cardio- denies chest pain or palpitations  GI- denies abdominal pain  Musculoskeletal- Negative except noted above  Skin- denies rashes or wounds    Physical Exam:   Ht 6' 4\" (1.93 m)   Wt 111 kg (245 lb)   BMI 29.82 kg/m²   General/Constitutional: No apparent distress: well-nourished and well developed.  Eyes: normal ocular motion  Lymphatic: No appreciable lymphadenopathy  Respiratory: Non-labored breathing  Vascular: No edema, swelling or tenderness, except as noted in detailed exam.  Integumentary: No impressive skin lesions present, except as noted in detailed exam.  Neuro: No ataxia or tremors noted  Psych: Normal mood and affect, oriented to person, place and time. Appropriate affect.  Musculoskeletal: Normal, except as noted in detailed exam and in HPI.    Examination    left        Incision Healing well, no " erythema or discharge  Sutures Previously removed.    Ecchymosis none    Swelling mild    Sensation Intact to light touch throughout sural, saphenous, superficial peroneal, deep peroneal and medial/lateral plantar nerve distributions.  [place]-Mary[place] 5.07 filament (10g) testing deferred.    Cardiovascular Brisk capillary refill < 2 seconds,intact DP and PT pulses    Special Tests None      Imaging Studies:   No new images    Scribe Attestation      I,:   am acting as a scribe while in the presence of the attending physician.:       I,:   personally performed the services described in this documentation    as scribed in my presence.:                James R. Lachman, MD  Foot & Ankle Surgery   Department of Orthopaedic Surgery  [facility]      I personally performed the service.    James R. Lachman, MD

## 2025-01-16 ENCOUNTER — OFFICE VISIT (OUTPATIENT)
Dept: PHYSICAL THERAPY | Facility: CLINIC | Age: 35
End: 2025-01-16

## 2025-01-16 DIAGNOSIS — S86.012D RUPTURE OF LEFT ACHILLES TENDON, SUBSEQUENT ENCOUNTER: Primary | ICD-10-CM

## 2025-01-16 PROCEDURE — 97112 NEUROMUSCULAR REEDUCATION: CPT | Performed by: PHYSICAL THERAPIST

## 2025-01-16 PROCEDURE — 97140 MANUAL THERAPY 1/> REGIONS: CPT | Performed by: PHYSICAL THERAPIST

## 2025-01-16 NOTE — PROGRESS NOTES
Daily Note     Today's date: 2025  Patient name: Raghav Blair  : 1990  MRN: [id number]  Referring provider: Lachman, James R, MD  Dx:   Encounter Diagnosis     ICD-10-CM    1. Rupture of left Achilles tendon, subsequent encounter  S86.012D                      Subjective: Pt reports he is doing well. He removed the final wedges from sneaker and is doing well      Objective: See treatment diary below      Assessment: Tolerated treatment well. Patient exhibited good technique with therapeutic exercises and would benefit from continued PT. Progressing well per MD protocol       Plan: Continue per plan of care.      Precautions: S/P Left achilles repair DOS 10/24 Dr. Lachman. Pt may begin weaning into sneaker with one heel lift  as per Dr Lachman protocol.      Manuals    PROM Left ankle Avoid passive DF  All dir to leo RR All dir   To leo  MJC All dir to leo TM RR  Inv EV  RR to leo INV EV      All dir to leo TM  PF INV EV     TM All dir to leo RR                                          Neuro Re-Ed             Ankle isometrics TB 4 way band  Seated 4   Way GTB  X30 ea   4   Way GTB  X30 ea  4   Way GTB  X30 ea  -- 4 way Blue 30x  --   TB 4 way band    Ankle ABC          --   Ankle AROM all planes           --   Baps Board           --   NU step in CAM boot              Upright bike for endurance   L12 x 10 min sneaker  L12 x10 min in sneaker L12 x10 min L4 x10 min L4 10 min L10 x 10 min  L12 x10 min  L12 x10 min L12 x 10 min sneaker    Elliptical for LE endurance and ankle ROM       L5 x5 min      Toe curls       --       Towel IV/EV      --       Seated HR x20 x20 X20  x20 x20 x20    x20   Biodex  L12 LOS x 2 trials, Maze L12 x 2 trials  L12  LOS med dif  X  2 trials, Maze L12 x 2 trials  L12  LOS med dif  X  2 trials, Maze L12 x 2 trials  L8  LOS med dif  X  2 trials, Maze L12 x 2 trials  L12  LOS med dif  X  2 trials, Maze L12 x 2 trials   "L12  LOS med dif  X  2 trials, Maze L12 x 2 trials  L8  LOS Hard dif  X  2 trials, Maze L8 x 2 trials   L8  LOS Hard dif  X  2 trials, Maze L8 x 2 trials  L12 LOS x 2 trials, Maze L12 x 2 trials    BOSU Lunge  5''20x 5\" x20 Fwd/lat 5\" x20 Fwd/lat 5\" x20 Fwd/lat 5\" x20 Fwd/lat 5\" x20 Fwd/lat 5\" x20  Fwd/lat 5\" x20 5''20x   SLS Foam 30''3x 30\" x3 30\" x3 30\" x3 30\" x3 30\" x3 30\" x3  30\" x3 30''3x   SLS on trampoline w/ pivot and med ball toss       Yellow x10 tosses ea dir  Yellow x10 tosses ea dir                 Ther Ex             SLR x4 dir    X20 each dir  X30 each dir      5   Leg press in CAM boot SL 80# 3x10   3x10  HR TR 80# 3x10 SL  80#  3x10  DL  220#   3x10  HR TR  80#  3x10 SL  90#  3x10  DL  230#   3x10  HR TR  90#  3x10 SL  90#  3x10  DL  230#   3x10  HR TR  90#  3x10 SL  90#  3x10  DL  230#   3x10  HR TR  90#  3x10  SL  90#  3x10  DL  230#   3x10  HR TR  90#  3x10 SL  130#  3x10  DL  270#   3x10  HR TR  130#  3x10   SL  130#  3x10  DL  270#   3x10  HR TR  130#  3x10 SL 60# 2x10   2x10  HR TR 60# 2x10   Leg Ext/Leg Curl              Step up  Forward Lat 2x10  Forward Lat 2x10  Fwd/ Lat 8\" 3x10  Fwd/ Lat 8\" 3x10  Fwd/ Lat 8\" 3x10  Fwd/ Lat 8\" 3x10 Lat 8\" x30  Lat 8\" x30 Forward Lat 2x10    Step overs   Fwd/Retro 6\" x20 Fwd/Retro 6\" x20 Fwd/Retro 6\" x20 Fwd/Retro 6\" x20 Fwd/Retro 8\" x20  Fwd/Retro 8\" x20                                           Ther Activity                                       Gait Training                                       Modalities                                                                          "

## 2025-01-21 ENCOUNTER — OFFICE VISIT (OUTPATIENT)
Dept: PHYSICAL THERAPY | Facility: CLINIC | Age: 35
End: 2025-01-21

## 2025-01-21 DIAGNOSIS — S86.012D RUPTURE OF LEFT ACHILLES TENDON, SUBSEQUENT ENCOUNTER: Primary | ICD-10-CM

## 2025-01-21 PROCEDURE — 97112 NEUROMUSCULAR REEDUCATION: CPT

## 2025-01-21 PROCEDURE — 97140 MANUAL THERAPY 1/> REGIONS: CPT

## 2025-01-21 NOTE — PROGRESS NOTES
Daily Note     Today's date: 2025  Patient name: Raghav Blair  : 1990  MRN: [id number]  Referring provider: Lachman, James R, MD  Dx:   Encounter Diagnosis     ICD-10-CM    1. Rupture of left Achilles tendon, subsequent encounter  S86.012D                      Subjective: Pt denied pain at rest. He is pleased with his progress this far.       Objective: See treatment diary below      Assessment: Pt is now 12 weeks post op. Tolerated treatment well. Patient demonstrated fatigue post treatment, exhibited good technique with therapeutic exercises, and would benefit from continued PT      Plan: Continue per plan of care.  Progress treament per protocol.      Precautions: S/P Left achilles repair DOS 10/24 Dr. Lachman. Pt may begin weaning into sneaker with one heel lift  as per Dr Lachman protocol.      Manuals    PROM Left ankle Avoid passive DF  All dir to leo RR All dir   To leo  MJC All dir to leo TM RR  Inv EV  RR to leo INV EV      All dir to leo TM  PF INV EV     TM All dir to leo TM                                          Neuro Re-Ed             Ankle isometrics TB 4 way band  Seated 4   Way GTB  X30 ea   4   Way GTB  X30 ea  4   Way GTB  X30 ea  -- 4 way Blue 30x  --      Ankle ABC             Ankle AROM all planes              Baps Board              NU step in CAM boot              Upright bike for endurance   L12 x 10 min sneaker  L12 x10 min in sneaker L12 x10 min L4 x10 min L4 10 min L10 x 10 min  L12 x10 min  L12 x10 min    Elliptical for LE endurance and ankle ROM       L5 x5 min   L5 x10 min   Seated HR x20 x20 X20  x20 x20 x20       Biodex  L12 LOS x 2 trials, Maze L12 x 2 trials  L12  LOS med dif  X  2 trials, Maze L12 x 2 trials  L12  LOS med dif  X  2 trials, Maze L12 x 2 trials  L8  LOS med dif  X  2 trials, Maze L12 x 2 trials  L12  LOS med dif  X  2 trials, Maze L12 x 2 trials  L12  LOS med dif  X  2 trials, Maze L12 x 2  "trials  L8  LOS Hard dif  X  2 trials, Maze L8 x 2 trials   L8  LOS Hard dif  X  2 trials, Maze L8 x 2 trials  L6  LOS Hard dif x3 trials, Maze L5 x2 trials    BOSU Lunge  5''20x 5\" x20 Fwd/lat 5\" x20 Fwd/lat 5\" x20 Fwd/lat 5\" x20 Fwd/lat 5\" x20 Fwd/lat 5\" x20  Fwd/lat 5\" x20 Fwd/lat 5\" x20   BOSU jogger          X1 min   SLS Foam 30''3x 30\" x3 30\" x3 30\" x3 30\" x3 30\" x3 30\" x3  30\" x3 30''3x   SLS on trampoline w/ pivot and med ball toss       Yellow x10 tosses ea dir  Yellow x10 tosses ea dir Yellow x10 tosses ea dir   Hiesmens          8\" 3x10 on LLE                Ther Ex             SLR x4 dir    X20 each dir  X30 each dir         Leg press in CAM boot SL 80# 3x10   3x10  HR TR 80# 3x10 SL  80#  3x10  DL  220#   3x10  HR TR  80#  3x10 SL  90#  3x10  DL  230#   3x10  HR TR  90#  3x10 SL  90#  3x10  DL  230#   3x10  HR TR  90#  3x10 SL  90#  3x10  DL  230#   3x10  HR TR  90#  3x10  SL  90#  3x10  DL  230#   3x10  HR TR  90#  3x10 SL  130#  3x10  DL  270#   3x10  HR TR  130#  3x10   SL  130#  3x10  DL  270#   3x10  HR TR  130#  3x10 B/L 300# 3x10    HR # 3x10   Leg Ext/Leg Curl              Step up  Forward Lat 2x10  Forward Lat 2x10  Fwd/ Lat 8\" 3x10  Fwd/ Lat 8\" 3x10  Fwd/ Lat 8\" 3x10  Fwd/ Lat 8\" 3x10 Lat 8\" x30  Lat 8\" x30 Lat 8\" x30   Step overs   Fwd/Retro 6\" x20 Fwd/Retro 6\" x20 Fwd/Retro 6\" x20 Fwd/Retro 6\" x20 Fwd/Retro 8\" x20  Fwd/Retro 8\" x20 Fwd/Retro 8\" x20                                          Ther Activity                                       Gait Training                                       Modalities                                                                            "

## 2025-01-23 ENCOUNTER — OFFICE VISIT (OUTPATIENT)
Dept: UROLOGY | Facility: MEDICAL CENTER | Age: 35
End: 2025-01-23
Payer: COMMERCIAL

## 2025-01-23 ENCOUNTER — OFFICE VISIT (OUTPATIENT)
Dept: PHYSICAL THERAPY | Facility: CLINIC | Age: 35
End: 2025-01-23

## 2025-01-23 VITALS
WEIGHT: 242 LBS | OXYGEN SATURATION: 98 % | BODY MASS INDEX: 29.47 KG/M2 | HEART RATE: 96 BPM | SYSTOLIC BLOOD PRESSURE: 120 MMHG | DIASTOLIC BLOOD PRESSURE: 66 MMHG | HEIGHT: 76 IN

## 2025-01-23 DIAGNOSIS — Z30.09 STERILIZATION CONSULT: Primary | ICD-10-CM

## 2025-01-23 DIAGNOSIS — S86.012D RUPTURE OF LEFT ACHILLES TENDON, SUBSEQUENT ENCOUNTER: Primary | ICD-10-CM

## 2025-01-23 PROCEDURE — 99203 OFFICE O/P NEW LOW 30 MIN: CPT | Performed by: UROLOGY

## 2025-01-23 PROCEDURE — 97112 NEUROMUSCULAR REEDUCATION: CPT

## 2025-01-23 PROCEDURE — 97140 MANUAL THERAPY 1/> REGIONS: CPT

## 2025-01-23 RX ORDER — ALPRAZOLAM 1 MG/1
TABLET ORAL
Qty: 1 TABLET | Refills: 0 | Status: SHIPPED | OUTPATIENT
Start: 2025-01-23

## 2025-01-23 NOTE — PROGRESS NOTES
"   HISTORY:        This patient has 4 children and would like to have a vasectomy.  He and his wife or partner are sure they want no more children, and are aware that vasectomy is intended to be a permanent form of sterilization.    He has been told and understands the following:         We will order a semen analysis to check for sterility after two months, and that he must use protection during that time.         No guarantee can be made of permanent sterility, and that failure of the procedure is not common, but can occur.  Furthermore, spontaneous reestablishment of the flow sperm is quite rare but is a possible reason for failure of the procedure.           Although it is not mandatory, if he wants to request another semen sample at any time in the future, to ensure continued sterility, he can do so, at his decision.         Potential complications of the procedure include, but are not limited to:  Excessive bleeding which can cause significant swelling; infections; chronic lingering pain of the testicle; damage to the blood supply of the testicle, which might lead to testicular atrophy.         The patient has told me he understands the above statements, and wishes to proceed with scheduling the vasectomy.        Review of Systems      Objective:     Physical Exam  Genitourinary:     Comments: Penis testes normal, vasa easily palpable          No results found for: \"PSA\"]  BUN   Date Value Ref Range Status   10/08/2024 16 7 - 28 mg/dL Final     Creatinine   Date Value Ref Range Status   10/08/2024 1.01 0.53 - 1.30 mg/dL Final     No components found for: \"CBC\"    Patient Active Problem List   Diagnosis    Overweight (BMI 25.0-29.9)    Negative depression screening    Achilles tendon rupture, left, initial encounter    Rupture of left Achilles tendon, subsequent encounter    S/P orthopedic surgery, follow-up exam        Patient ID: Raghav Blair is a 34 y.o. male.      Current Outpatient Medications: "     ALPRAZolam (XANAX) 1 mg tablet, 1-2 hr before procedure, Disp: 1 tablet, Rfl: 0    amphetamine-dextroamphetamine (ADDERALL XR) 20 MG 24 hr capsule, Take 20 mg by mouth, Disp: , Rfl:     rosuvastatin (CRESTOR) 5 mg tablet, Take 5 mg by mouth daily, Disp: , Rfl:     testosterone cypionate (DEPO-TESTOSTERONE) 200 mg/mL SOLN, INJECT UP TO 1ML INTRAMUSCULARLY OR SUBCUTANEOUSLY TWICE WEEKLY AS DIRECTED, Disp: , Rfl:     ondansetron (ZOFRAN) 4 mg tablet, Take 1 tablet (4 mg total) by mouth every 8 (eight) hours as needed for nausea or vomiting (Patient not taking: Reported on 1/23/2025), Disp: 10 tablet, Rfl: 0    oxyCODONE (Roxicodone) 5 immediate release tablet, Take 1 tablet (5 mg total) by mouth every 4 (four) hours as needed for severe pain for up to 30 doses Max Daily Amount: 30 mg (Patient not taking: Reported on 1/23/2025), Disp: 30 tablet, Rfl: 0    rivaroxaban (Xarelto) 10 mg tablet, Take 1 tablet (10 mg total) by mouth daily Do not start until after surgery Do not start before October 24, 2024. (Patient not taking: Reported on 1/23/2025), Disp: 30 tablet, Rfl: 0

## 2025-01-23 NOTE — PROGRESS NOTES
Daily Note     Today's date: 2025  Patient name: Raghav Blair  : 1990  MRN: [id number]  Referring provider: Lachman, James R, MD  Dx:   Encounter Diagnosis     ICD-10-CM    1. Rupture of left Achilles tendon, subsequent encounter  S86.012D                      Subjective: Pt denied all pain at rest prior to PT. He is anxious to begin his own weight training again.       Objective: See treatment diary below      Assessment: Tolerated treatment well. Patient demonstrated fatigue post treatment, exhibited good technique with therapeutic exercises, and would benefit from continued PT      Plan: Continue per plan of care.  Progress treament per protocol.      Precautions: S/P Left achilles repair DOS 10/24 Dr. Lachman. Pt may begin weaning into sneaker with one heel lift  as per Dr Lachman protocol.      Manuals    PROM Left ankle Avoid passive DF  All dir to leo TM All dir   To leo  MJC All dir to leo TM RR  Inv EV  RR to leo INV EV      All dir to leo TM  PF INV EV     TM All dir to leo TM                                          Neuro Re-Ed             Ankle isometrics  Seated 4   Way GTB  X30 ea   4   Way GTB  X30 ea  4   Way GTB  X30 ea  -- 4 way Blue 30x  --      Ankle ABC             Ankle AROM all planes              Baps Board              NU step in CAM boot              Upright bike for endurance    L12 x10 min in sneaker L12 x10 min L4 x10 min L4 10 min L10 x 10 min  L12 x10 min  L12 x10 min    Elliptical for LE endurance and ankle ROM L5 x10 min      L5 x5 min   L5 x10 min   Seated HR  x20 X20  x20 x20 x20       Biodex  L5  LOS Hard dif x3 trials, Maze L5 x2 trials  L12  LOS med dif  X  2 trials, Maze L12 x 2 trials  L12  LOS med dif  X  2 trials, Maze L12 x 2 trials  L8  LOS med dif  X  2 trials, Maze L12 x 2 trials  L12  LOS med dif  X  2 trials, Maze L12 x 2 trials  L12  LOS med dif  X  2 trials, Maze L12 x 2 trials  L8  LOS Hard  "dif  X  2 trials, Maze L8 x 2 trials   L8  LOS Hard dif  X  2 trials, Maze L8 x 2 trials  L6  LOS Hard dif x3 trials, Maze L5 x2 trials    BOSU Lunge  Fwd/lat 5\" x20 5\" x20 Fwd/lat 5\" x20 Fwd/lat 5\" x20 Fwd/lat 5\" x20 Fwd/lat 5\" x20 Fwd/lat 5\" x20  Fwd/lat 5\" x20 Fwd/lat 5\" x20   BOSU jogger X1 min         X1 min   SLS Foam 30''3x 30\" x3 30\" x3 30\" x3 30\" x3 30\" x3 30\" x3  30\" x3 30''3x   SLS on trampoline w/ pivot and med ball toss Yellow x10 tosses ea dir      Yellow x10 tosses ea dir  Yellow x10 tosses ea dir Yellow x10 tosses ea dir   Hiesmens 8\" 3x10 on LLE         8\" 3x10 on LLE                Ther Ex             SLR x4 dir    X20 each dir  X30 each dir         Leg press in CAM boot # 3x10    HR # 3x10 SL  80#  3x10  DL  220#   3x10  HR TR  80#  3x10 SL  90#  3x10  DL  230#   3x10  HR TR  90#  3x10 SL  90#  3x10  DL  230#   3x10  HR TR  90#  3x10 SL  90#  3x10  DL  230#   3x10  HR TR  90#  3x10  SL  90#  3x10  DL  230#   3x10  HR TR  90#  3x10 SL  130#  3x10  DL  270#   3x10  HR TR  130#  3x10   SL  130#  3x10  DL  270#   3x10  HR TR  130#  3x10 B/L 300# 3x10    HR # 3x10   Leg Ext/Leg Curl              Step up  Lat 8\" x30 Forward Lat 2x10  Fwd/ Lat 8\" 3x10  Fwd/ Lat 8\" 3x10  Fwd/ Lat 8\" 3x10  Fwd/ Lat 8\" 3x10 Lat 8\" x30  Lat 8\" x30 Lat 8\" x30   Step overs Fwd/Retro 8\" x20  Fwd/Retro 6\" x20 Fwd/Retro 6\" x20 Fwd/Retro 6\" x20 Fwd/Retro 6\" x20 Fwd/Retro 8\" x20  Fwd/Retro 8\" x20 Fwd/Retro 8\" x20                                          Ther Activity                                       Gait Training                                       Modalities                                                                              "

## 2025-01-28 ENCOUNTER — OFFICE VISIT (OUTPATIENT)
Dept: PHYSICAL THERAPY | Facility: CLINIC | Age: 35
End: 2025-01-28

## 2025-01-28 DIAGNOSIS — S86.012D RUPTURE OF LEFT ACHILLES TENDON, SUBSEQUENT ENCOUNTER: Primary | ICD-10-CM

## 2025-01-28 PROCEDURE — 97140 MANUAL THERAPY 1/> REGIONS: CPT

## 2025-01-28 PROCEDURE — 97112 NEUROMUSCULAR REEDUCATION: CPT

## 2025-01-28 NOTE — PROGRESS NOTES
Daily Note     Today's date: 2025  Patient name: Raghav Blair  : 1990  MRN: [id number]  Referring provider: Lachman, James R, MD  Dx:   Encounter Diagnosis     ICD-10-CM    1. Rupture of left Achilles tendon, subsequent encounter  S86.012D                      Subjective: Pt denied all pain at rest prior to PT. He did squat this weekend. He was able to squat 245 lbs w/o issue.       Objective: See treatment diary below      Assessment: Tolerated treatment well. Advised pt to modify his squatting form to avoid overloading his achilles. He verbally agrees. Patient demonstrated fatigue post treatment, exhibited good technique with therapeutic exercises, and would benefit from continued PT      Plan: Continue per plan of care.  Progress note during next visit.  Progress treatment as tolerated.       Precautions: S/P Left achilles repair DOS 10/24 Dr. Lachman. Pt may begin weaning into sneaker with one heel lift  as per Dr Lachman protocol.      Manuals    PROM Left ankle Avoid passive DF  All dir to leo TM All dir to leo TM  RR  Inv EV  RR to leo INV EV      All dir to leo TM  PF INV EV     TM All dir to leo TM                                          Neuro Re-Ed             Ankle isometrics    4   Way GTB  X30 ea  -- 4 way Blue 30x  --      Ankle ABC             Ankle AROM all planes              Baps Board              NU step in CAM boot              Upright bike for endurance      L4 x10 min L4 10 min L10 x 10 min  L12 x10 min  L12 x10 min    Elliptical for LE endurance and ankle ROM L5 x10 min L5 x10 min     L5 x5 min   L5 x10 min   Seated HR    x20 x20 x20       Biodex  L5  LOS Hard dif x3 trials, Maze L5 x2 trials  L5  LOS Hard dif x3 trials, Maze L5 x2 trials   L8  LOS med dif  X  2 trials, Maze L12 x 2 trials  L12  LOS med dif  X  2 trials, Maze L12 x 2 trials  L12  LOS med dif  X  2 trials, Maze L12 x 2 trials  L8  LOS Hard dif  X  2 trials,  "Maze L8 x 2 trials   L8  LOS Hard dif  X  2 trials, Maze L8 x 2 trials  L6  LOS Hard dif x3 trials, Maze L5 x2 trials    BOSU Lunge  Fwd/lat 5\" x20 Fwd/lat 5\" x30  Fwd/lat 5\" x20 Fwd/lat 5\" x20 Fwd/lat 5\" x20 Fwd/lat 5\" x20  Fwd/lat 5\" x20 Fwd/lat 5\" x20   BOSU jogger X1 min X1 min        X1 min   SLS Foam 30''3x 30''3x  30\" x3 30\" x3 30\" x3 30\" x3  30\" x3 30''3x   SLS on trampoline w/ pivot and med ball toss Yellow x10 tosses ea dir Yellow x10 tosses ea dir     Yellow x10 tosses ea dir  Yellow x10 tosses ea dir Yellow x10 tosses ea dir   Hiesmens 8\" 3x10 on LLE 8\" 3x10 on LLE        8\" 3x10 on LLE                Ther Ex             SLR x4 dir    X20 each dir  X30 each dir         Leg press in CAM boot # 3x10    HR # 3x10 B/L 320# 3x10    HR # 3x10  SL  90#  3x10  DL  230#   3x10  HR TR  90#  3x10 SL  90#  3x10  DL  230#   3x10  HR TR  90#  3x10  SL  90#  3x10  DL  230#   3x10  HR TR  90#  3x10 SL  130#  3x10  DL  270#   3x10  HR TR  130#  3x10   SL  130#  3x10  DL  270#   3x10  HR TR  130#  3x10 B/L 300# 3x10    HR # 3x10   Leg Ext/Leg Curl              Step up  Lat 8\" x30 Lat 8\" x30  Fwd/ Lat 8\" 3x10  Fwd/ Lat 8\" 3x10  Fwd/ Lat 8\" 3x10 Lat 8\" x30  Lat 8\" x30 Lat 8\" x30   Step overs Fwd/Retro 8\" x20 Fwd/Retro 8\" x30  Fwd/Retro 6\" x20 Fwd/Retro 6\" x20 Fwd/Retro 6\" x20 Fwd/Retro 8\" x20  Fwd/Retro 8\" x20 Fwd/Retro 8\" x20                                          Ther Activity                                       Gait Training                                       Modalities                                                                                "

## 2025-01-30 ENCOUNTER — EVALUATION (OUTPATIENT)
Dept: PHYSICAL THERAPY | Facility: CLINIC | Age: 35
End: 2025-01-30

## 2025-01-30 DIAGNOSIS — S86.012D RUPTURE OF LEFT ACHILLES TENDON, SUBSEQUENT ENCOUNTER: Primary | ICD-10-CM

## 2025-01-30 PROCEDURE — 97140 MANUAL THERAPY 1/> REGIONS: CPT | Performed by: PHYSICAL THERAPIST

## 2025-01-30 PROCEDURE — 97112 NEUROMUSCULAR REEDUCATION: CPT | Performed by: PHYSICAL THERAPIST

## 2025-01-30 NOTE — PROGRESS NOTES
PT Progress Note   Today's date: 2025  Patient name: Raghav Blair  : 1990  MRN: [id number]  Referring provider: Lachman, James R, MD  Dx:   Encounter Diagnosis     ICD-10-CM    1. Rupture of left Achilles tendon, subsequent encounter  S86.012D               Assessment  Impairments: abnormal coordination, abnormal gait, abnormal muscle firing, abnormal muscle tone, abnormal or restricted ROM, abnormal movement, activity intolerance, impaired physical strength, lacks appropriate home exercise program, pain with function, safety issue, weight-bearing intolerance and poor posture   Functional limitations: Difficulty with amb, stair negotiation, receational activities.  Symptom irritability: moderate     Assessment details: Raghav Blair is a 34 y.o. male who presents to outpatient PT with a  Rupture of left Achilles tendon, subsequent encounter.  No further referral appears necessary at this time based upon examination results. Pt presents with decreased strength, ROM, balance, functional activity tolerance, and pain with movement in his left foot /ankle which is  limiting his ability to perform the aforementioned functional activities.  Etiologic factors include repetitive poor body mechanics. Prognosis is good given HEP compliance and PT 2-3/wk.  Please contact me if you have any questions or recommendations.  Thank you for the opportunity to share in  [facility], [place].     RA     Raghav Blair has demonstrated decreased pain, increased strength, increased range of motion, and increased activity tolerance since starting physical therapy services. He reports an overall improvement of 60% thus far. Progressing well per protocol. Updated POC this date. Transitioned to sneaker with heel wedge, without incident. He continues to present with pain, decreased strength, decreased range of motion, and decreased activity tolerance and would benefit from additional skilled  physical therapy interventions to address impairments and maximize function.    RA 1/30    Raghav Blair has demonstrated decreased pain, increased strength, increased range of motion, and increased activity tolerance since starting physical therapy services. He reports an overall improvement of 75% thus far. He is progressing well per MD protocol. Able to perform bilateral heel raise without difficulty. Weakness in the left gastroc continues during SL heel raise. Educated on proper progression up to this point. He has resumed recreational weight training at home without issue  He continues  to present with pain, decreased strength, decreased range of motion, and decreased activity tolerance and would benefit from additional skilled physical therapy interventions to address impairments and maximize function.         Understanding of Dx/Px/POC: good     Prognosis: good     Goals  STG to be achieved in 4 weeks      1. Pt will reduce subjective pain rating by at least 50 percent the help facilitate return to PLOF  Met   2. Pt will improve Left ankle AROM by at least 10 degrees to help promote improved functional activity tolerance   Met   3. Pt will improve Left ankle MMT scores by at least 1/2 grade to promote improved functional activity tolerance   Met      LTG to be achieved in 6-8 weeks   1. Pt will be complaint with HEP   Met   2. Pt will improve Left ankle AROM to WFL, to help facilitate independence with ADL's, IADL's, and functional activities   Progressing   3. Pt will improve Left ankle Strength to WFL to help facilitate independence with ADL's, IADL's, and functional activities   Progressing   4. Pt will have no limitations with ambulation to help facilitate independence at home and in the community.   Progressing   5. Pt will have no limitations with stair negotiation to help facilitate independence at home and in the community   Progressing               Plan  Patient would benefit from: skilled  physical therapy     Planned therapy interventions: ADL training, balance, balance/weight bearing training, flexibility, functional ROM exercises, gait training, graded activity, graded exercise, graded motor, home exercise program, joint mobilization, manual therapy, neuromuscular re-education, patient education, postural training, strengthening, stretching, therapeutic activities and therapeutic exercise     Frequency: 2x week  Duration in weeks: 12  Plan of Care beginning date: 2024  Plan of Care expiration date: 2025  Treatment plan discussed with: patient, PTA and referring physician           Subjective Evaluation     History of Present Illness  Mechanism of injury: 34 year old male presents to outpatient PT S/P left achilles repair Dr. Lachman DOS 10/24  Doing well post operatively. He is ambulating with no AD this date FWB on LLE. He is currently wearing three achilles wedges. And will be taking one wedge out each week. Will follow Dr. Lachman Achilles repair protocol.    Patient Goals  Patient goals for therapy: increased strength and decreased pain  Patient goal: to get back to normal  Pain     RA     Current pain ratin   0   0  At best pain ratin   0   0    At worst pain ratin   0   0           Objective      RA   RA      Active Range of Motion   Left Ankle/Foot   Dorsiflexion (ke): -35 degrees   0 deg    WFL  Plantar flexion: 50 degrees    50 deg   WFL  Inversion: 25 degrees    25 deg   WFL  Eversion: 5 degrees     5 deg    WFL      Additional Active Range of Motion Details  Strength NT this date     RA  4-/5 all planes     RA    4+/5 all planes     Surgical incision(s) inspected. Incision(s) clean, dry and intact with no signs/symptoms of infection. Patient was educated on signs/symptoms of infection and was advised to contact MD immediately if suspect infection.           Precautions: S/P Left achilles repair DOS 10/24 Dr. Lachman. Pt may begin  "weaning into sneaker with one heel lift 12/18 as per Dr Lachman protocol.      Manuals 1/23 1/28 1/31 1/6 1/9 1/13 1/16 1/21   PROM Left ankle Avoid passive DF  All dir to leo TM All dir to leo TM  All dir to tolerance  RR to leo INV EV      All dir to leo TM  PF INV EV     TM All dir to leo TM                                          Neuro Re-Ed             Ankle isometrics     -- 4 way Blue 30x  --      Ankle ABC             Ankle AROM all planes              Baps Board              NU step in CAM boot              Upright bike for endurance       L4 10 min L10 x 10 min  L12 x10 min  L12 x10 min    Elliptical for LE endurance and ankle ROM L5 x10 min L5 x10 min  L5 x10 min   L5 x5 min   L5 x10 min   Seated HR     x20 x20       Biodex  L5  LOS Hard dif x3 trials, Maze L5 x2 trials  L5  LOS Hard dif x3 trials, Maze L5 x2 trials   L8  LOS med dif  X  2 trials, Maze L12 x 2 trials  L12  LOS med dif  X  2 trials, Maze L12 x 2 trials  L12  LOS med dif  X  2 trials, Maze L12 x 2 trials  L8  LOS Hard dif  X  2 trials, Maze L8 x 2 trials   L8  LOS Hard dif  X  2 trials, Maze L8 x 2 trials  L6  LOS Hard dif x3 trials, Maze L5 x2 trials    BOSU Lunge  Fwd/lat 5\" x20 Fwd/lat 5\" x30  Fwd/lat 5\" x20 Fwd/lat 5\" x20 Fwd/lat 5\" x20 Fwd/lat 5\" x20  Fwd/lat 5\" x20 Fwd/lat 5\" x20   BOSU jogger X1 min X1 min        X1 min   SLS Foam 30''3x 30''3x  30\" x3 30\" x3 30\" x3 30\" x3  30\" x3 30''3x   SLS on trampoline w/ pivot and med ball toss Yellow x10 tosses ea dir Yellow x10 tosses ea dir  Yellow x10 tosses ea dir   Yellow x10 tosses ea dir  Yellow x10 tosses ea dir Yellow x10 tosses ea dir   Hiesmens 8\" 3x10 on LLE 8\" 3x10 on LLE  8\" 3x10 on LLE      8\" 3x10 on LLE   HR TR Biodex     50/50 3x10          Ther Ex             SLR x4 dir     X30 each dir         Leg press in CAM boot # 3x10    HR # 3x10 B/L 320# 3x10    HR # 3x10  B/L 320# 3x10    HR # 3x10 SL  90#  3x10  DL  230#   3x10  HR TR  90#  3x10  " "SL  90#  3x10  DL  230#   3x10  HR TR  90#  3x10 SL  130#  3x10  DL  270#   3x10  HR TR  130#  3x10   SL  130#  3x10  DL  270#   3x10  HR TR  130#  3x10 B/L 300# 3x10    HR # 3x10   Leg Ext/Leg Curl              Step up  Lat 8\" x30 Lat 8\" x30  Fwd/ Lat 8\" 3x10  Fwd/ Lat 8\" 3x10  Fwd/ Lat 8\" 3x10 Lat 8\" x30  Lat 8\" x30 Lat 8\" x30   Step overs Fwd/Retro 8\" x20 Fwd/Retro 8\" x30  Fwd/Retro 6\" x20 Fwd/Retro 6\" x20 Fwd/Retro 6\" x20 Fwd/Retro 8\" x20  Fwd/Retro 8\" x20 Fwd/Retro 8\" x20                                          Ther Activity                                       Gait Training                                       Modalities                                                                                  "

## 2025-02-03 ENCOUNTER — TELEPHONE (OUTPATIENT)
Age: 35
End: 2025-02-03

## 2025-02-03 NOTE — TELEPHONE ENCOUNTER
Patient called to r/s his appointment for Vasectomy with Dr. Mccarthy on 02/12/25 but call dropped.  It was a bad connection. Not able to reschedule it.

## 2025-02-04 ENCOUNTER — OFFICE VISIT (OUTPATIENT)
Dept: PHYSICAL THERAPY | Facility: CLINIC | Age: 35
End: 2025-02-04

## 2025-02-04 DIAGNOSIS — S86.012D RUPTURE OF LEFT ACHILLES TENDON, SUBSEQUENT ENCOUNTER: Primary | ICD-10-CM

## 2025-02-04 PROCEDURE — 97112 NEUROMUSCULAR REEDUCATION: CPT | Performed by: PHYSICAL THERAPIST

## 2025-02-04 PROCEDURE — 97140 MANUAL THERAPY 1/> REGIONS: CPT | Performed by: PHYSICAL THERAPIST

## 2025-02-04 NOTE — PROGRESS NOTES
Daily Note     Today's date: 2025  Patient name: Raghav Blair  : 1990  MRN: [id number]  Referring provider: Lachman, James R, MD  Dx:   Encounter Diagnosis     ICD-10-CM    1. Rupture of left Achilles tendon, subsequent encounter  S86.012D                      Subjective: Pt doing well . Would like to progress strengthening this visit.       Objective: See treatment diary below      Assessment: Tolerated treatment well. Patient exhibited good technique with therapeutic exercises and would benefit from continued PT., Moderate PF weakness observed during newly added toe walk and HR TR this session. Continue to progress per protocol focusing on slowly increasing LLE plantar flexion strength.       Plan: Continue per plan of care.      Precautions: S/P Left achilles repair DOS 10/24 Dr. Lachman. Pt may begin weaning into sneaker with one heel lift  as per Dr Lachman protocol.      Manuals    PROM Left ankle Avoid passive DF  All dir to leo TM All dir to leo TM  All dir to tolerance  NP  INV EV      All dir to leo TM  PF INV EV     TM All dir to leo TM                                          Neuro Re-Ed             Ankle isometrics     -- 4 way Blue 30x  --      Ankle ABC             Ankle AROM all planes              Baps Board              NU step in CAM boot              Upright bike for endurance       L4 10 min L10 x 10 min  L12 x10 min  L12 x10 min    Elliptical for LE endurance and ankle ROM L5 x10 min L5 x10 min  L5 x10 min   L5 x5 min   L5 x10 min   Seated HR     SL Seates 75# on LLE  x20       Biodex  L5  LOS Hard dif x3 trials, Maze L5 x2 trials  L5  LOS Hard dif x3 trials, Maze L5 x2 trials   L8  LOS med dif  X  2 trials, Maze L12 x 2 trials   L12  LOS med dif  X  2 trials, Maze L12 x 2 trials  L8  LOS Hard dif  X  2 trials, Maze L8 x 2 trials   L8  LOS Hard dif  X  2 trials, Maze L8 x 2 trials  L6  LOS Hard dif x3 trials, Maze L5 x2 trials   "  BOSU Lunge  Fwd/lat 5\" x20 Fwd/lat 5\" x30  Fwd/lat 5\" x20 Fwd/lat 5\" x20 Fwd/lat 5\" x20 Fwd/lat 5\" x20  Fwd/lat 5\" x20 Fwd/lat 5\" x20   BOSU jogger X1 min X1 min        X1 min   SLS Foam 30''3x 30''3x  30\" x3 3 30\" x3 30\" x3  30\" x3 30''3x   SLS on trampoline w/ pivot and med ball toss Yellow x10 tosses ea dir Yellow x10 tosses ea dir  Yellow x10 tosses ea dir   Yellow x10 tosses ea dir  Yellow x10 tosses ea dir Yellow x10 tosses ea dir   Hiesmens 8\" 3x10 on LLE 8\" 3x10 on LLE  8\" 3x10 on LLE      8\" 3x10 on LLE   HR TR Biodex     50/50 3x10          Ther Ex             SLR x4 dir             Leg press in CAM boot # 3x10    HR # 3x10 B/L 320# 3x10    HR # 3x10  B/L 320# 3x10    HR # 3x10 B/L 340# 3x10    HR TR SL 80# 3x10 Concentric Focus    # 3x5 reps concentric focus   SL  90#  3x10  DL  230#   3x10  HR TR  90#  3x10 SL  130#  3x10  DL  270#   3x10  HR TR  130#  3x10   SL  130#  3x10  DL  270#   3x10  HR TR  130#  3x10 B/L 300# 3x10    HR # 3x10   Leg Ext/Leg Curl              Step up  Lat 8\" x30 Lat 8\" x30  Fwd/ Lat 8\" 3x10  Fwd/ Lat 8\" 3x10  Fwd/ Lat 8\" 3x10 Lat 8\" x30  Lat 8\" x30 Lat 8\" x30   Step overs Fwd/Retro 8\" x20 Fwd/Retro 8\" x30  Fwd/Retro 6\" x20 Fwd/Retro 6\" x20 Fwd/Retro 6\" x20 Fwd/Retro 8\" x20  Fwd/Retro 8\" x20 Fwd/Retro 8\" x20   Toe walk      5 passes forward Lateral                                   Ther Activity                                       Gait Training                                       Modalities                                                                                    "

## 2025-02-06 ENCOUNTER — OFFICE VISIT (OUTPATIENT)
Dept: PHYSICAL THERAPY | Facility: CLINIC | Age: 35
End: 2025-02-06

## 2025-02-06 DIAGNOSIS — S86.012D RUPTURE OF LEFT ACHILLES TENDON, SUBSEQUENT ENCOUNTER: Primary | ICD-10-CM

## 2025-02-06 PROCEDURE — 97112 NEUROMUSCULAR REEDUCATION: CPT

## 2025-02-06 PROCEDURE — 97110 THERAPEUTIC EXERCISES: CPT

## 2025-02-06 NOTE — PROGRESS NOTES
"Daily Note     Today's date: 2025  Patient name: Raghav Blair  : 1990  MRN: [id number]  Referring provider: Lachman, James R, MD  Dx:   Encounter Diagnosis     ICD-10-CM    1. Rupture of left Achilles tendon, subsequent encounter  S86.012D                      Subjective: Pt reports he is doing well. Returns to Dr Lachman in April.       Objective: See treatment diary below      Assessment: Tolerated treatment well. Patient demonstrated fatigue post treatment, exhibited good technique with therapeutic exercises, and would benefit from continued PT for strengthening.       Plan: Continue per plan of care.      Precautions: S/P Left achilles repair DOS 10/24 Dr. Lachman. Pt may begin weaning into sneaker with one heel lift  as per Dr Lachman protocol.      Manuals    PROM Left ankle Avoid passive DF  All dir to leo TM All dir to leo TM  All dir to tolerance  NP  NP All dir to leo TM  PF INV EV     TM All dir to leo TM                                          Neuro Re-Ed             Ankle isometrics     --  --      Ankle ABC             Ankle AROM all planes              Baps Board              NU step in CAM boot              Upright bike for endurance       L4 10 min  L12 x10 min  L12 x10 min    Elliptical for LE endurance and ankle ROM L5 x10 min L5 x10 min  L5 x10 min  L5 x10 min L5 x5 min   L5 x10 min   Seated HR     SL Seates 75# on LLE  SL seated   75# on LLE       Biodex  L5  LOS Hard dif x3 trials, Maze L5 x2 trials  L5  LOS Hard dif x3 trials, Maze L5 x2 trials   L8  LOS med dif  X  2 trials, Maze L12 x 2 trials    L8  LOS Hard dif  X  2 trials, Maze L8 x 2 trials   L8  LOS Hard dif  X  2 trials, Maze L8 x 2 trials  L6  LOS Hard dif x3 trials, Maze L5 x2 trials    BOSU Lunge  Fwd/lat 5\" x20 Fwd/lat 5\" x30  Fwd/lat 5\" x20 Fwd/lat 5\" x20 Fwd/lat 5\" x20 Fwd/lat 5\" x20  Fwd/lat 5\" x20 Fwd/lat 5\" x20   BOSU jogger X1 min X1 min        X1 min   SLS " "Foam 30''3x 30''3x  30\" x3   30\" x3  30\" x3 30''3x   SLS on trampoline w/ pivot and med ball toss Yellow x10 tosses ea dir Yellow x10 tosses ea dir  Yellow x10 tosses ea dir  Yellow x10 tosses ea dir Yellow x10 tosses ea dir  Yellow x10 tosses ea dir Yellow x10 tosses ea dir   Hiesmens 8\" 3x10 on LLE 8\" 3x10 on LLE  8\" 3x10 on LLE      8\" 3x10 on LLE   HR TR Biodex     50/50 3x10          Ther Ex             SLR x4 dir             Leg press in CAM boot # 3x10    HR # 3x10 B/L 320# 3x10    HR # 3x10  B/L 320# 3x10    HR # 3x10 B/L 340# 3x10    HR TR SL 80# 3x10 Concentric Focus    # 3x5 reps concentric focus  B/L 340# 3x10    HR TR SL 80# 3x10 Concentric Focus    # 3x5 reps concentric focus  SL  130#  3x10  DL  270#   3x10  HR TR  130#  3x10   SL  130#  3x10  DL  270#   3x10  HR TR  130#  3x10 B/L 300# 3x10    HR # 3x10   Leg Ext/Leg Curl              Step up  Lat 8\" x30 Lat 8\" x30  Fwd/ Lat 8\" 3x10  Fwd/ Lat 8\" 3x10   Lat 8\" x30  Lat 8\" x30 Lat 8\" x30   Step overs Fwd/Retro 8\" x20 Fwd/Retro 8\" x30  Fwd/Retro 6\" x20 Fwd/Retro 6\" x20  Fwd/Retro 8\" x20  Fwd/Retro 8\" x20 Fwd/Retro 8\" x20   Toe walk      5 passes forward Lateral  5 passes forward Lateral                                  Ther Activity                                       Gait Training                                       Modalities                                                                                      "

## 2025-02-11 ENCOUNTER — OFFICE VISIT (OUTPATIENT)
Dept: PHYSICAL THERAPY | Facility: CLINIC | Age: 35
End: 2025-02-11

## 2025-02-11 DIAGNOSIS — S86.012D RUPTURE OF LEFT ACHILLES TENDON, SUBSEQUENT ENCOUNTER: Primary | ICD-10-CM

## 2025-02-11 PROCEDURE — 97112 NEUROMUSCULAR REEDUCATION: CPT | Performed by: PHYSICAL THERAPIST

## 2025-02-11 PROCEDURE — 97140 MANUAL THERAPY 1/> REGIONS: CPT | Performed by: PHYSICAL THERAPIST

## 2025-02-11 NOTE — PROGRESS NOTES
"Daily Note     Today's date: 2025  Patient name: Raghav Blair  : 1990  MRN: [id number]  Referring provider: Lachman, James R, MD  Dx:   Encounter Diagnosis     ICD-10-CM    1. Rupture of left Achilles tendon, subsequent encounter  S86.012D                      Subjective: Pt doing well. He can notice strength gains in the left foot/ankle      Objective: See treatment diary below      Assessment: Tolerated treatment well. Patient exhibited good technique with therapeutic exercises and would benefit from continued PT      Plan: Continue per plan of care.      Precautions: S/P Left achilles repair DOS 10/24 Dr. Lachman. Pt may begin weaning into sneaker with one heel lift  as per Dr Lachman protocol.      Manuals    PROM Left ankle Avoid passive DF  All dir to leo TM All dir to leo TM  All dir to tolerance  NP  NP RR  PF INV EV     TM All dir to leo TM                                          Neuro Re-Ed             Ankle isometrics     --  --      Ankle ABC             Ankle AROM all planes              Baps Board              NU step in CAM boot              Upright bike for endurance       L4 10 min    L12 x10 min    Elliptical for LE endurance and ankle ROM L5 x10 min L5 x10 min  L5 x10 min  L5 x10 min L5 x5 min   L5 x10 min   Seated HR     SL Seates 75# on LLE  SL seated   75# on LLE SL seated   75# on LLE      Biodex  L5  LOS Hard dif x3 trials, Maze L5 x2 trials  L5  LOS Hard dif x3 trials, Maze L5 x2 trials   L8  LOS med dif  X  2 trials, Maze L12 x 2 trials      L8  LOS Hard dif  X  2 trials, Maze L8 x 2 trials  L6  LOS Hard dif x3 trials, Maze L5 x2 trials    BOSU Lunge  Fwd/lat 5\" x20 Fwd/lat 5\" x30  Fwd/lat 5\" x20 Fwd/lat 5\" x20 Fwd/lat 5\" x20 Fwd/lat 5\" x20  Fwd/lat 5\" x20 Fwd/lat 5\" x20   BOSU jogger X1 min X1 min        X1 min   SLS Foam 30''3x 30''3x  30\" x3     30\" x3 30''3x   SLS on trampoline w/ pivot and med ball toss Yellow x10 " "tosses ea dir Yellow x10 tosses ea dir  Yellow x10 tosses ea dir  Yellow x10 tosses ea dir Yellow x10 tosses ea dir  Yellow x10 tosses ea dir Yellow x10 tosses ea dir   Hiesmens 8\" 3x10 on LLE 8\" 3x10 on LLE  8\" 3x10 on LLE      8\" 3x10 on LLE   HR TR Biodex     50/50 3x10          Ther Ex             SLR x4 dir             Leg press in CAM boot # 3x10    HR # 3x10 B/L 320# 3x10    HR # 3x10  B/L 320# 3x10    HR # 3x10 B/L 340# 3x10    HR TR SL 80# 3x10 Concentric Focus    # 3x5 reps concentric focus  B/L 340# 3x10    HR TR SL 80# 3x10 Concentric Focus    # 3x5 reps concentric focus  SL  340#  3x10    HR TR SL 80# 3x10 Concentric Focus    # 3x5 reps concentric focus    SL  130#  3x10  DL  270#   3x10  HR TR  130#  3x10 B/L 300# 3x10    HR # 3x10   Leg Ext/Leg Curl              Step up  Lat 8\" x30 Lat 8\" x30  Fwd/ Lat 8\" 3x10  Fwd/ Lat 8\" 3x10     Lat 8\" x30 Lat 8\" x30   Step overs Fwd/Retro 8\" x20 Fwd/Retro 8\" x30  Fwd/Retro 6\" x20 Fwd/Retro 6\" x20    Fwd/Retro 8\" x20 Fwd/Retro 8\" x20   Toe walk      5 passes forward Lateral  5 passes forward Lateral  5 passes forward Lateral                                Ther Activity                                       Gait Training                                       Modalities                                                                                        "

## 2025-02-13 ENCOUNTER — OFFICE VISIT (OUTPATIENT)
Dept: PHYSICAL THERAPY | Facility: CLINIC | Age: 35
End: 2025-02-13

## 2025-02-13 DIAGNOSIS — S86.012D RUPTURE OF LEFT ACHILLES TENDON, SUBSEQUENT ENCOUNTER: Primary | ICD-10-CM

## 2025-02-13 PROCEDURE — 97140 MANUAL THERAPY 1/> REGIONS: CPT | Performed by: PHYSICAL THERAPIST

## 2025-02-13 PROCEDURE — 97112 NEUROMUSCULAR REEDUCATION: CPT | Performed by: PHYSICAL THERAPIST

## 2025-02-13 NOTE — PROGRESS NOTES
"Daily Note     Today's date: 2025  Patient name: Raghav Blair  : 1990  MRN: [id number]  Referring provider: Lachman, James R, MD  Dx:   Encounter Diagnosis     ICD-10-CM    1. Rupture of left Achilles tendon, subsequent encounter  S86.012D                      Subjective: Pt doing well he continues to be complaint with his exercises at home.       Objective: See treatment diary below      Assessment: Tolerated treatment well. Patient exhibited good technique with therapeutic exercises and would benefit from continued PT  Weakness in left gastroc soleus continues during TE. Educated patient on expectations to progress to next phase of protocol ( 10 unilateral heel rasies ) . He verbalizes understanding.   Plan: Continue per plan of care.      Precautions: S/P Left achilles repair DOS 10/24 Dr. Lachman. Pt may begin weaning into sneaker with one heel lift  as per Dr Lachman protocol.      Manuals    PROM Left ankle Avoid passive DF  All dir to leo TM All dir to leo TM  All dir to tolerance  NP  NP RR RR PF INV EV     TM All dir to leo TM                                          Neuro Re-Ed             Ankle isometrics     --  --      Ankle ABC             Ankle AROM all planes              Baps Board              NU step in CAM boot              Upright bike for endurance       L4 10 min    L12 x10 min    Elliptical for LE endurance and ankle ROM L5 x10 min L5 x10 min  L5 x10 min  L5 x10 min L5 x5 min L5 x5 min  L5 x10 min   Seated HR     SL Seates 75# on LLE  SL seated   75# on LLE SL seated   75# on LLE SL seated   85# on LLE     Biodex  L5  LOS Hard dif x3 trials, Maze L5 x2 trials  L5  LOS Hard dif x3 trials, Maze L5 x2 trials   L8  LOS med dif  X  2 trials, Maze L12 x 2 trials      L8  LOS Hard dif  X  2 trials, Maze L8 x 2 trials  L6  LOS Hard dif x3 trials, Maze L5 x2 trials    BOSU Lunge  Fwd/lat 5\" x20 Fwd/lat 5\" x30  Fwd/lat 5\" x20 " "Fwd/lat 5\" x20 Fwd/lat 5\" x20 Fwd/lat 5\" x20 Fwd/lat 5\" x20 Fwd/lat 5\" x20 Fwd/lat 5\" x20   BOSU jogger X1 min X1 min        X1 min   SLS Foam 30''3x 30''3x  30\" x3     30\" x3 30''3x   SLS on trampoline w/ pivot and med ball toss Yellow x10 tosses ea dir Yellow x10 tosses ea dir  Yellow x10 tosses ea dir  Yellow x10 tosses ea dir Yellow x10 tosses ea dir Yellow x10 tosses ea dir Yellow x10 tosses ea dir Yellow x10 tosses ea dir   Hiesmens 8\" 3x10 on LLE 8\" 3x10 on LLE  8\" 3x10 on LLE      8\" 3x10 on LLE   HR TR Biodex     50/50 3x10          Ther Ex             SLR x4 dir             Leg press in CAM boot # 3x10    HR # 3x10 B/L 320# 3x10    HR # 3x10  B/L 320# 3x10    HR # 3x10 B/L 340# 3x10    HR TR SL 80# 3x10 Concentric Focus    # 3x5 reps concentric focus  B/L 340# 3x10    HR TR SL 80# 3x10 Concentric Focus    # 3x5 reps concentric focus  SL  340#  3x10    HR TR SL 80# 3x10 Concentric Focus    # 3x5 reps concentric focus  SL  240#  3x10    HR TR SL 80# 3x10 Concentric Focus    # 3x5 reps concentric focus   SL  130#  3x10  DL  270#   3x10  HR TR  130#  3x10 B/L 300# 3x10    HR # 3x10   Leg Ext/Leg Curl              Step up  Lat 8\" x30 Lat 8\" x30  Fwd/ Lat 8\" 3x10  Fwd/ Lat 8\" 3x10     Lat 8\" x30 Lat 8\" x30   Step overs Fwd/Retro 8\" x20 Fwd/Retro 8\" x30  Fwd/Retro 6\" x20 Fwd/Retro 6\" x20    Fwd/Retro 8\" x20 Fwd/Retro 8\" x20   Toe walk      5 passes forward Lateral  5 passes forward Lateral  5 passes forward Lateral 5 passes forward Lateral                               Ther Activity                                       Gait Training                                       Modalities                                                                                          "

## 2025-02-18 ENCOUNTER — OFFICE VISIT (OUTPATIENT)
Dept: PHYSICAL THERAPY | Facility: CLINIC | Age: 35
End: 2025-02-18

## 2025-02-18 DIAGNOSIS — S86.012D RUPTURE OF LEFT ACHILLES TENDON, SUBSEQUENT ENCOUNTER: Primary | ICD-10-CM

## 2025-02-18 PROCEDURE — 97140 MANUAL THERAPY 1/> REGIONS: CPT

## 2025-02-18 PROCEDURE — 97112 NEUROMUSCULAR REEDUCATION: CPT

## 2025-02-18 NOTE — PROGRESS NOTES
"Daily Note     Today's date: 2025  Patient name: Raghav Blair  : 1990  MRN: [id number]  Referring provider: Lachman, James R, MD  Dx:   Encounter Diagnosis     ICD-10-CM    1. Rupture of left Achilles tendon, subsequent encounter  S86.012D                      Subjective: Pt denied all pain at rest prior to PT. He has been very active at home. He is anxious to begin running.       Objective: See treatment diary below      Assessment: Tolerated treatment well. Patient demonstrated fatigue post treatment, exhibited good technique with therapeutic exercises, and would benefit from continued PT      Plan: Continue per plan of care.  Progress treament per protocol.      Precautions: S/P Left achilles repair DOS 10/24 Dr. Lachman. Pt may begin weaning into sneaker with one heel lift  as per Dr Lachman protocol.      Manuals     PROM Left ankle Avoid passive DF  All dir to leo TM All dir to leo TM  All dir to tolerance  NP  NP RR RR TM                                           Neuro Re-Ed             Ankle isometrics     --  --      Ankle ABC             Ankle AROM all planes              Baps Board              NU step in CAM boot              Upright bike for endurance       L4 10 min        Elliptical for LE endurance and ankle ROM L5 x10 min L5 x10 min  L5 x10 min  L5 x10 min L5 x5 min L5 x5 min L5 x10 min    Seated HR     SL Seates 75# on LLE  SL seated   75# on LLE SL seated   75# on LLE SL seated   85# on LLE SL seated   95# on LLE    Biodex  L5  LOS Hard dif x3 trials, Maze L5 x2 trials  L5  LOS Hard dif x3 trials, Maze L5 x2 trials   L8  LOS med dif  X  2 trials, Maze L12 x 2 trials          BOSU Lunge  Fwd/lat 5\" x20 Fwd/lat 5\" x30  Fwd/lat 5\" x20 Fwd/lat 5\" x20 Fwd/lat 5\" x20 Fwd/lat 5\" x20 Fwd/lat 5\" x20 Fwd/lat 5\" x20    BOSU jogger X1 min X1 min           SLS Foam 30''3x 30''3x  30\" x3         SLS on trampoline w/ pivot and med ball toss " "Yellow x10 tosses ea dir Yellow x10 tosses ea dir  Yellow x10 tosses ea dir  Yellow x10 tosses ea dir Yellow x10 tosses ea dir Yellow x10 tosses ea dir Yellow x10 tosses ea dir    Hiesmens 8\" 3x10 on LLE 8\" 3x10 on LLE  8\" 3x10 on LLE         HR TR Biodex     50/50 3x10          Ther Ex             SLR x4 dir             Leg press in CAM boot # 3x10    HR # 3x10 B/L 320# 3x10    HR # 3x10  B/L 320# 3x10    HR # 3x10 B/L 340# 3x10    HR TR SL 80# 3x10 Concentric Focus    # 3x5 reps concentric focus  B/L 340# 3x10    HR TR SL 80# 3x10 Concentric Focus    # 3x5 reps concentric focus  SL  340#  3x10    HR TR SL 80# 3x10 Concentric Focus    # 3x5 reps concentric focus  SL  240#  3x10    HR TR SL 80# 3x10 Concentric Focus    # 3x5 reps concentric focus  DC B/L    HR TR # 3x10 Concentric Focus    # 3x5 reps concentric focus    Leg Ext/Leg Curl              Step up  Lat 8\" x30 Lat 8\" x30  Fwd/ Lat 8\" 3x10  Fwd/ Lat 8\" 3x10         Step overs Fwd/Retro 8\" x20 Fwd/Retro 8\" x30  Fwd/Retro 6\" x20 Fwd/Retro 6\" x20        Toe walk      5 passes forward Lateral  5 passes forward Lateral  5 passes forward Lateral 5 passes forward Lateral 5 passes forward Lateral                              Ther Activity                                       Gait Training                                       Modalities                                                                                            "

## 2025-02-20 ENCOUNTER — OFFICE VISIT (OUTPATIENT)
Dept: PHYSICAL THERAPY | Facility: CLINIC | Age: 35
End: 2025-02-20

## 2025-02-20 DIAGNOSIS — S86.012D RUPTURE OF LEFT ACHILLES TENDON, SUBSEQUENT ENCOUNTER: Primary | ICD-10-CM

## 2025-02-20 PROCEDURE — 97140 MANUAL THERAPY 1/> REGIONS: CPT

## 2025-02-20 PROCEDURE — 97112 NEUROMUSCULAR REEDUCATION: CPT

## 2025-02-20 NOTE — PROGRESS NOTES
"Daily Note     Today's date: 2025  Patient name: Raghav Blair  : 1990  MRN: [id number]  Referring provider: Lachman, James R, MD  Dx:   Encounter Diagnosis     ICD-10-CM    1. Rupture of left Achilles tendon, subsequent encounter  S86.012D                      Subjective: Pt is anxious to begin impact training and running. He denied all pain prior to PT. He remains very active at home.       Objective: See treatment diary below      Assessment: Tolerated treatment well. Patient demonstrated fatigue post treatment, exhibited good technique with therapeutic exercises, and would benefit from continued PT      Plan: Continue per plan of care.  Progress treament per protocol.      Precautions: S/P Left achilles repair DOS 10/24 Dr. Lachman. Pt may begin weaning into sneaker with one heel lift  as per Dr Lachman protocol.      Manuals    PROM Left ankle Avoid passive DF  All dir to tolerance  NP  NP RR RR TM TM                                 Neuro Re-Ed          Ankle isometrics  --  --      Ankle ABC          Ankle AROM all planes           Baps Board           NU step in CAM boot           Upright bike for endurance    L4 10 min        Elliptical for LE endurance and ankle ROM L5 x10 min  L5 x10 min L5 x5 min L5 x5 min L5 x10 min L5 x10 min   Seated HR  SL Seates 75# on LLE  SL seated   75# on LLE SL seated   75# on LLE SL seated   85# on LLE SL seated   95# on LLE SL seated @ knee ext machine   110# on LLE   Biodex  L8  LOS med dif  X  2 trials, Maze L12 x 2 trials          BOSU Lunge  Fwd/lat 5\" x20 Fwd/lat 5\" x20 Fwd/lat 5\" x20 Fwd/lat 5\" x20 Fwd/lat 5\" x20 Fwd/lat 5\" x20 Fwd/lat 5\" x30   BOSU jogger          SLS Foam 30\" x3         SLS on trampoline w/ pivot and med ball toss Yellow x10 tosses ea dir  Yellow x10 tosses ea dir Yellow x10 tosses ea dir Yellow x10 tosses ea dir Yellow x10 tosses ea dir Yellow x10 tosses ea dir   Hiesmens 8\" 3x10 on LLE       " "  HR TR Biodex  50/50 3x10          Ther Ex          SLR x4 dir          Leg press in CAM boot B/L 320# 3x10    HR # 3x10 B/L 340# 3x10    HR TR SL 80# 3x10 Concentric Focus    # 3x5 reps concentric focus  B/L 340# 3x10    HR TR SL 80# 3x10 Concentric Focus    # 3x5 reps concentric focus  SL  340#  3x10    HR TR SL 80# 3x10 Concentric Focus    # 3x5 reps concentric focus  SL  240#  3x10    HR TR SL 80# 3x10 Concentric Focus    # 3x5 reps concentric focus  DC B/L    HR TR # 3x10 Concentric Focus    # 3x5 reps concentric focus HR TR # 3x10 Concentric Focus    # 3x5 reps concentric focus   Fwd walk w/ calf press and eccentric return at [place]       40# x10 passes    Leg Ext/Leg Curl           Step up  Fwd/ Lat 8\" 3x10  Fwd/ Lat 8\" 3x10         Step overs Fwd/Retro 6\" x20 Fwd/Retro 6\" x20        Toe walk   5 passes forward Lateral  5 passes forward Lateral  5 passes forward Lateral 5 passes forward Lateral 5 passes forward Lateral 5 passes forward Lateral                       Ther Activity                              Gait Training                              Modalities                                                                                     "

## 2025-02-27 ENCOUNTER — OFFICE VISIT (OUTPATIENT)
Dept: PHYSICAL THERAPY | Facility: CLINIC | Age: 35
End: 2025-02-27

## 2025-02-27 DIAGNOSIS — S86.012D RUPTURE OF LEFT ACHILLES TENDON, SUBSEQUENT ENCOUNTER: Primary | ICD-10-CM

## 2025-02-27 PROCEDURE — 97112 NEUROMUSCULAR REEDUCATION: CPT | Performed by: PHYSICAL THERAPIST

## 2025-02-27 PROCEDURE — 97140 MANUAL THERAPY 1/> REGIONS: CPT | Performed by: PHYSICAL THERAPIST

## 2025-02-27 NOTE — PROGRESS NOTES
"Daily Note     Today's date: 2025  Patient name: Raghav Blair  : 1990  MRN: [id number]  Referring provider: Lachman, James R, MD  Dx:   Encounter Diagnosis     ICD-10-CM    1. Rupture of left Achilles tendon, subsequent encounter  S86.012D                      Subjective: Pt doing well today, he is anxious to start running on the treadmill.       Objective: See treatment diary below      Assessment: Tolerated treatment well. Patient exhibited good technique with therapeutic exercises and would benefit from continued PT  Performed run jog on treadmill this session without incident. Pt is progressing well. Left gastroc soleus strength is slowly improving.     Plan: Continue per plan of care.      Precautions: S/P Left achilles repair DOS 10/24 Dr. Lachman. Pt may begin weaning into sneaker with one heel lift  as per Dr Lachman protocol.      Manuals    PROM Left ankle Avoid passive DF  All dir to tolerance   RR RR RR TM TM                                 Neuro Re-Ed          Ankle isometrics  --  --      Ankle ABC          Ankle AROM all planes           Baps Board           NU step in CAM boot           Upright bike for endurance            Elliptical for LE endurance and ankle ROM L5 x10 min  L5 x10 min L5 x5 min L5 x5 min L5 x10 min L5 x10 min   Seated HR  SL Seates 75# on LLE  SL seated @ knee ext machine   110# on LLE SL seated   75# on LLE SL seated   85# on LLE SL seated   95# on LLE SL seated @ knee ext machine   110# on LLE   Biodex  L8  LOS med dif  X  2 trials, Maze L12 x 2 trials          BOSU Lunge  Fwd/lat 5\" x20 Fwd/lat 5\" x20  Fwd/lat 5\" x20 Fwd/lat 5\" x20 Fwd/lat 5\" x20 Fwd/lat 5\" x30   BOSU jogger          SLS Foam 30\" x3         SLS on trampoline w/ pivot and med ball toss Yellow x10 tosses ea dir  Yellow x10 tosses ea dir Yellow x10 tosses ea dir Yellow x10 tosses ea dir Yellow x10 tosses ea dir Yellow x10 tosses ea dir   Hiesmens 8\" 3x10 " "on LLE         HR TR Biodex  50/50 3x10          Ther Ex          SLR x4 dir          Leg press in CAM boot B/L 320# 3x10    HR # 3x10 B/L 340# 3x10    HR TR SL 80# 3x10 Concentric Focus    # 3x5 reps concentric focus   HR TR # 3x10 Concentric Focus    # 3x5 reps concentric focus SL  340#  3x10    HR TR SL 80# 3x10 Concentric Focus    # 3x5 reps concentric focus  SL  240#  3x10    HR TR SL 80# 3x10 Concentric Focus    # 3x5 reps concentric focus  DC B/L    HR TR # 3x10 Concentric Focus    # 3x5 reps concentric focus HR TR # 3x10 Concentric Focus    # 3x5 reps concentric focus   Fwd walk w/ calf press and eccentric return at [place]       40# x10 passes    Leg Ext/Leg Curl           Step up  Fwd/ Lat 8\" 3x10  Fwd/ Lat 8\" 3x10         Step overs Fwd/Retro 6\" x20 Fwd/Retro 6\" x20        Toe walk   5 passes forward Lateral  5 passes forward Lateral  5 passes forward Lateral 5 passes forward Lateral 5 passes forward Lateral 5 passes forward Lateral   Treadmill    30 sec walk 30 sec jog,        Plyo Leg press    120, 160 200 10x each        Ther Activity                              Gait Training                              Modalities                                                                                       "

## 2025-03-04 ENCOUNTER — OFFICE VISIT (OUTPATIENT)
Dept: PHYSICAL THERAPY | Facility: CLINIC | Age: 35
End: 2025-03-04

## 2025-03-04 DIAGNOSIS — S86.012D RUPTURE OF LEFT ACHILLES TENDON, SUBSEQUENT ENCOUNTER: Primary | ICD-10-CM

## 2025-03-04 PROCEDURE — 97140 MANUAL THERAPY 1/> REGIONS: CPT | Performed by: PHYSICAL THERAPIST

## 2025-03-04 PROCEDURE — 97112 NEUROMUSCULAR REEDUCATION: CPT | Performed by: PHYSICAL THERAPIST

## 2025-03-04 NOTE — PROGRESS NOTES
"Daily Note     Today's date: 3/4/2025  Patient name: Raghav Blair  : 1990  MRN: [id number]  Referring provider: Lachman, James R, MD  Dx:   Encounter Diagnosis     ICD-10-CM    1. Rupture of left Achilles tendon, subsequent encounter  S86.012D                      Subjective: No new complaints after last session       Objective: See treatment diary below      Assessment: Tolerated treatment well. Patient exhibited good technique with therapeutic exercises and would benefit from continued PT      Plan: Continue per plan of care.      Precautions: S/P Left achilles repair DOS 10/24 Dr. Lachman. Pt may begin weaning into sneaker with one heel lift  as per Dr Lachman protocol.      Manuals 1/31   2/27 3/4 2/13 2/18 2/20   PROM Left ankle Avoid passive DF  All dir to tolerance   RR RR RR TM TM                                 Neuro Re-Ed          Ankle isometrics  --  --      Ankle ABC          Ankle AROM all planes           Baps Board           NU step in CAM boot           Upright bike for endurance            Elliptical for LE endurance and ankle ROM L5 x10 min  L5 x10 min L5 x5 min L5 x5 min L5 x10 min L5 x10 min   Seated HR  SL Seates 75# on LLE  SL seated @ knee ext machine   110# on LLE SL seated   130# on LLE SL seated   85# on LLE SL seated   95# on LLE SL seated @ knee ext machine   110# on LLE   Biodex  L8  LOS med dif  X  2 trials, Maze L12 x 2 trials          BOSU Lunge  Fwd/lat 5\" x20 Fwd/lat 5\" x20  Fwd/lat 5\" x20 Fwd/lat 5\" x20 Fwd/lat 5\" x20 Fwd/lat 5\" x30   BOSU jogger          SLS Foam 30\" x3         SLS on trampoline w/ pivot and med ball toss Yellow x10 tosses ea dir  Yellow x10 tosses ea dir Yellow x10 tosses ea dir Yellow x10 tosses ea dir Yellow x10 tosses ea dir Yellow x10 tosses ea dir   Hiesmens 8\" 3x10 on LLE         HR TR Biodex  50/50 3x10          Ther Ex          SLR x4 dir          Leg press in CAM boot B/L 320# 3x10    HR # 3x10 B/L 340# 3x10    HR TR SL 80# " "3x10 Concentric Focus    # 3x5 reps concentric focus   HR TR # 3x10 Concentric Focus    # 3x5 reps concentric focus SL  340#  3x10    HR TR # 3x10 Concentric Focus    # 3x5 reps concentric focus  SL  240#  3x10    HR TR SL 80# 3x10 Concentric Focus    # 3x5 reps concentric focus  DC B/L    HR TR # 3x10 Concentric Focus    # 3x5 reps concentric focus HR TR # 3x10 Concentric Focus    # 3x5 reps concentric focus   Fwd walk w/ calf press and eccentric return at [place]       40# x10 passes    Leg Ext/Leg Curl           Step up  Fwd/ Lat 8\" 3x10  Fwd/ Lat 8\" 3x10         Step overs Fwd/Retro 6\" x20 Fwd/Retro 6\" x20        Toe walk   5 passes forward Lateral  5 passes forward Lateral  5 passes forward Lateral 5 passes forward Lateral 5 passes forward Lateral 5 passes forward Lateral   Treadmill    30 sec walk 30 sec jog,  4.5 MPH jog x 10 min       Plyo Leg press    120, 160 200 10x each  200 3x10       Ther Activity                              Gait Training                              Modalities                                                                                         "

## 2025-03-06 ENCOUNTER — OFFICE VISIT (OUTPATIENT)
Dept: PHYSICAL THERAPY | Facility: CLINIC | Age: 35
End: 2025-03-06

## 2025-03-06 DIAGNOSIS — S86.012D RUPTURE OF LEFT ACHILLES TENDON, SUBSEQUENT ENCOUNTER: Primary | ICD-10-CM

## 2025-03-06 PROCEDURE — 97112 NEUROMUSCULAR REEDUCATION: CPT | Performed by: PHYSICAL THERAPIST

## 2025-03-06 NOTE — PROGRESS NOTES
"Daily Note     Today's date: 3/6/2025  Patient name: Raghav Blair  : 1990  MRN: [id number]  Referring provider: Lachman, James R, MD  Dx:   Encounter Diagnosis     ICD-10-CM    1. Rupture of left Achilles tendon, subsequent encounter  S86.012D                      Subjective: Pt reports he is progressing well. He is doing well at home.       Objective: See treatment diary below      Assessment: Tolerated treatment well. Patient exhibited good technique with therapeutic exercises and would benefit from continued PT  Added line hops this session, to help facilitate return to Soccer. Good tolerance to progressions     Plan: Continue per plan of care.      Precautions: S/P Left achilles repair DOS 10/24 Dr. Lachman. Pt may begin weaning into sneaker with one heel lift  as per Dr Lachman protocol.      Manuals 1/31   2/27 3/4 3/6 2/18 2/20   PROM Left ankle Avoid passive DF  All dir to tolerance   RR RR RR TM TM                                 Neuro Re-Ed          Ankle isometrics  --  --      Ankle ABC          Ankle AROM all planes           Baps Board           NU step in CAM boot           Upright bike for endurance            Elliptical for LE endurance and ankle ROM L5 x10 min  L5 x10 min L5 x5 min L5 x5 min L5 x10 min L5 x10 min   Seated HR  SL Seates 75# on LLE  SL seated @ knee ext machine   110# on LLE SL seated   130# on LLE SL seated   130# on LLE SL seated   95# on LLE SL seated @ knee ext machine   110# on LLE   Biodex  L8  LOS med dif  X  2 trials, Maze L12 x 2 trials          BOSU Lunge  Fwd/lat 5\" x20 Fwd/lat 5\" x20  Fwd/lat 5\" x20  Fwd/lat 5\" x20 Fwd/lat 5\" x30   BOSU jogger          SLS Foam 30\" x3         SLS on trampoline w/ pivot and med ball toss Yellow x10 tosses ea dir  Yellow x10 tosses ea dir Yellow x10 tosses ea dir  Yellow x10 tosses ea dir Yellow x10 tosses ea dir   Hiesmens 8\" 3x10 on LLE         HR TR Biodex  50/50 3x10          Ther Ex          SLR x4 dir        " "  Leg press in CAM boot B/L 320# 3x10    HR # 3x10 B/L 340# 3x10    HR TR SL 80# 3x10 Concentric Focus    # 3x5 reps concentric focus   HR TR # 3x10 Concentric Focus    # 3x5 reps concentric focus SL  340#  3x10    HR TR # 3x10 Concentric Focus    # 3x5 reps concentric focus  SL  340#  3x10    HR TR # 3x10 Concentric Focus    # 3x5 reps concentric focus  DC B/L    HR TR # 3x10 Concentric Focus    # 3x5 reps concentric focus HR TR # 3x10 Concentric Focus    # 3x5 reps concentric focus   Fwd walk w/ calf press and eccentric return at [place]       40# x10 passes    Leg Ext/Leg Curl           Step up  Fwd/ Lat 8\" 3x10  Fwd/ Lat 8\" 3x10         Step overs Fwd/Retro 6\" x20 Fwd/Retro 6\" x20        Toe walk   5 passes forward Lateral  5 passes forward Lateral  5 passes forward Lateral 5 passes forward Lateral 5 passes forward Lateral 5 passes forward Lateral   Treadmill    30 sec walk 30 sec jog,  4.5 MPH jog x 10 min  6.0 MPH jog      Plyo Leg press    120, 160 200 10x each  200 3x10  240 3x10     Line hops      15''3x 4 dir     Ther Activity                              Gait Training                              Modalities                                                                                           "

## 2025-03-11 ENCOUNTER — OFFICE VISIT (OUTPATIENT)
Dept: PHYSICAL THERAPY | Facility: CLINIC | Age: 35
End: 2025-03-11

## 2025-03-11 DIAGNOSIS — S86.012D RUPTURE OF LEFT ACHILLES TENDON, SUBSEQUENT ENCOUNTER: Primary | ICD-10-CM

## 2025-03-11 PROCEDURE — 97112 NEUROMUSCULAR REEDUCATION: CPT

## 2025-03-11 PROCEDURE — 97140 MANUAL THERAPY 1/> REGIONS: CPT

## 2025-03-11 NOTE — PROGRESS NOTES
"Daily Note     Today's date: 3/11/2025  Patient name: Raghav Blair  : 1990  MRN: [id number]  Referring provider: Lachman, James R, MD  Dx:   Encounter Diagnosis     ICD-10-CM    1. Rupture of left Achilles tendon, subsequent encounter  S86.012D                      Subjective: Pt denied LLE pain. He did report that jogging has caused the onset of LBP. He blames his shoes.       Objective: See treatment diary below      Assessment: Held TMIll jogging this date secondary to subjective comments. Initiated multiple agility exercises on the aerobic step. Tolerated treatment well. Patient demonstrated fatigue post treatment, exhibited good technique with therapeutic exercises, and would benefit from continued PT      Plan: Continue per plan of care.  Progress treament per protocol.      Precautions: S/P Left achilles repair DOS 10/24 Dr. Lachman. Pt may begin weaning into sneaker with one heel lift  as per Dr Lachman protocol.      Manuals 1/31   2/27 3/4 3/6 3/11    PROM Left ankle Avoid passive DF  All dir to tolerance   RR RR RR TM    DC NV                                  Neuro Re-Ed          Ankle isometrics  --  --      Ankle ABC          Ankle AROM all planes           Baps Board           NU step in CAM boot           Upright bike for endurance            Elliptical for LE endurance and ankle ROM L5 x10 min  L5 x10 min L5 x5 min L5 x5 min L5 x10 min    Seated HR  SL Seates 75# on LLE  SL seated @ knee ext machine   110# on LLE SL seated   130# on LLE SL seated   130# on LLE SL seated @ knee ext machine   130# on LLE    Biodex  L8  LOS med dif  X  2 trials, Maze L12 x 2 trials          BOSU Lunge  Fwd/lat 5\" x20 Fwd/lat 5\" x20  Fwd/lat 5\" x20      BOSU jogger          SLS Foam 30\" x3         SLS on trampoline w/ pivot and med ball toss Yellow x10 tosses ea dir  Yellow x10 tosses ea dir Yellow x10 tosses ea dir      Hiesmens 8\" 3x10 on LLE         HR TR Biodex  50/50 3x10          Ther Ex      " "    SLR x4 dir          Leg press in CAM boot B/L 320# 3x10    HR # 3x10 B/L 340# 3x10    HR TR SL 80# 3x10 Concentric Focus    # 3x5 reps concentric focus   HR TR # 3x10 Concentric Focus    # 3x5 reps concentric focus SL  340#  3x10    HR TR # 3x10 Concentric Focus    # 3x5 reps concentric focus  SL  340#  3x10    HR TR # 3x10 Concentric Focus    # 3x5 reps concentric focus  HR/TR   SL 1570# 3x10 Concentric Focus    SL 10# 3x5 reps concentric focus    Fwd walk w/ calf press and eccentric return at [place]          Leg Ext/Leg Curl           Step up  Fwd/ Lat 8\" 3x10  Fwd/ Lat 8\" 3x10         Step overs Fwd/Retro 6\" x20 Fwd/Retro 6\" x20        Toe walk   5 passes forward Lateral  5 passes forward Lateral  5 passes forward Lateral 5 passes forward Lateral 5 passes forward Lateral    Treadmill    30 sec walk 30 sec jog,  4.5 MPH jog x 10 min  6.0 MPH jog  Deferd 2* to LBP    Plyo Leg press    120, 160 200 10x each  200 3x10  240 3x10 270# 3x10    Line hops      15'' 3x 4 dir 15'' 3x 4 dir    Aerobic step agility:  -quick steps  -lat up/over  -skiers      30\" x3 ea performed as a circuit              Ther Activity                              Gait Training                              Modalities                                                                                             "

## 2025-03-13 ENCOUNTER — APPOINTMENT (OUTPATIENT)
Dept: PHYSICAL THERAPY | Facility: CLINIC | Age: 35
End: 2025-03-13

## 2025-03-28 ENCOUNTER — PROCEDURE VISIT (OUTPATIENT)
Dept: UROLOGY | Facility: MEDICAL CENTER | Age: 35
End: 2025-03-28
Payer: COMMERCIAL

## 2025-03-28 VITALS
DIASTOLIC BLOOD PRESSURE: 80 MMHG | BODY MASS INDEX: 30.32 KG/M2 | WEIGHT: 249 LBS | HEIGHT: 76 IN | SYSTOLIC BLOOD PRESSURE: 112 MMHG | OXYGEN SATURATION: 99 % | HEART RATE: 101 BPM

## 2025-03-28 DIAGNOSIS — Z30.2 ENCOUNTER FOR VASECTOMY: Primary | ICD-10-CM

## 2025-03-28 DIAGNOSIS — Z30.2 ENCOUNTER FOR STERILIZATION: ICD-10-CM

## 2025-03-28 PROCEDURE — 88302 TISSUE EXAM BY PATHOLOGIST: CPT | Performed by: PATHOLOGY

## 2025-03-28 PROCEDURE — 55250 REMOVAL OF SPERM DUCT(S): CPT | Performed by: UROLOGY

## 2025-04-01 PROCEDURE — 88302 TISSUE EXAM BY PATHOLOGIST: CPT | Performed by: PATHOLOGY

## 2025-04-15 ENCOUNTER — OFFICE VISIT (OUTPATIENT)
Dept: OBGYN CLINIC | Facility: CLINIC | Age: 35
End: 2025-04-15
Payer: COMMERCIAL

## 2025-04-15 VITALS — HEIGHT: 76 IN | BODY MASS INDEX: 30.32 KG/M2 | WEIGHT: 249 LBS

## 2025-04-15 DIAGNOSIS — S86.012D RUPTURE OF LEFT ACHILLES TENDON, SUBSEQUENT ENCOUNTER: Primary | ICD-10-CM

## 2025-04-15 PROCEDURE — 99213 OFFICE O/P EST LOW 20 MIN: CPT | Performed by: ORTHOPAEDIC SURGERY

## 2025-04-15 NOTE — PROGRESS NOTES
James R Lachman, M.D.  Attending, Orthopaedic Surgery  Foot and Ankle  [facility]      ORTHOPAEDIC FOOT AND ANKLE CLINIC VISIT     Assessment:     Encounter Diagnosis   Name Primary?    Rupture of left Achilles tendon, subsequent encounter Yes       Left Achilles Tendon Repair  DOS: 10/24/24  Plan:   The patient verbalized understanding of exam findings and treatment plan. We engaged in the shared decision-making process and treatment options were discussed at length with the patient. Surgical and conservative management discussed today along with risks and benefits.  Patient is 6 months s/p above mentioned procedure  Continue PT and transition into HEP as directed by physical therapist  Compression stocking for swelling control   Ice and elevation for pain control  Patient has no restrictions and is stable from an orthopedic standpoint  May gradually return to normal activities as tolerated with modifications to avoid pain  Return if symptoms worsen or fail to improve.      History of Present Illness:   Chief Complaint:   Chief Complaint   Patient presents with    Follow-up     Follow up of the left ankle. Feeling ok. Haven't been to PT in a little. Doing stuff at home.      Raghav Blair is a 34 y.o. male who is being seen in follow-up for above procedure. When we last saw he we recommended WBAT in sneaker.  Pain has improved. No residual pain is present    NSAIDsYes   Injections No   Bracing/Orthotics Yes    Physical Therapy Yes     Orthopedic Surgical History:   See below    Past Medical, Surgical and Social History:  Past Medical History:  has a past medical history of Allergic.  Problem List: does not have any pertinent problems on file.  Past Surgical History:  has a past surgical history that includes pr repair primary open/prq ruptured achilles tendon (Left, 10/24/2024) and Vasectomy (Bilateral, 03/28/2025).  Family History: family history includes Alcohol abuse in his  "maternal grandmother; Anxiety disorder in his mother and sister; Arthritis in his maternal grandfather, maternal grandmother, mother, and paternal grandmother; Cancer in his maternal aunt; Depression in his father, maternal grandmother, mother, and paternal grandmother; Drug abuse in his maternal grandmother; Early death in his paternal grandfather; Hyperlipidemia in his father and mother; Mental illness in his maternal grandmother; Psoriasis in his daughter; Psychosis in his maternal grandmother; Vision loss in his maternal aunt.  Social History:  reports that he has quit smoking. His smoking use included cigarettes, pipe, and cigars. He has a 10 pack-year smoking history. He has quit using smokeless tobacco. He reports current alcohol use of about 10.0 standard drinks of alcohol per week. He reports that he does not use drugs.  Current Medications: has a current medication list which includes the following prescription(s): alprazolam, amphetamine-dextroamphetamine, rosuvastatin, and testosterone cypionate.  Allergies: has no known allergies.     Review of Systems:  General- denies fever/chills  HEENT- denies hearing loss or sore throat  Eyes- denies eye pain or visual disturbances, denies red eyes  Respiratory- denies cough or SOB  Cardio- denies chest pain or palpitations  GI- denies abdominal pain  Endocrine- denies urinary frequency  Urinary- denies pain with urination  Musculoskeletal- Negative except noted above  Skin- denies rashes or wounds  Neurological- denies dizziness or headache  Psychiatric- denies anxiety or difficulty concentrating    Physical Exam:   Ht 6' 4\" (1.93 m)   Wt 113 kg (249 lb)   BMI 30.31 kg/m²   General/Constitutional: No apparent distress: well-nourished and well developed.  Eyes: normal ocular motion  Lymphatic: No appreciable lymphadenopathy  Respiratory: Non-labored breathing  Vascular: No edema, swelling or tenderness, except as noted in detailed exam.  Integumentary: No " impressive skin lesions present, except as noted in detailed exam.  Neuro: No ataxia or tremors noted  Psych: Normal mood and affect, oriented to person, place and time. Appropriate affect.  Musculoskeletal: Normal, except as noted in detailed exam and in HPI.    Examination    Left      Gait Normal   Musculoskeletal NTTP    Skin Normal.  Well-healed incisions.    Nails Normal    Range of Motion  20 degrees dorsiflexion, 30 degrees plantarflexion  Subtalar motion: normal    Stability Stable    Muscle Strength 5/5 tibialis anterior  5/5 gastrocnemius-soleus  5/5 posterior tibialis  5/5 peroneal/eversion strength  5/5 EHL  5/5 FHL    Neurologic Normal    Sensation Intact to light touch throughout sural, saphenous, superficial peroneal, deep peroneal and medial/lateral plantar nerve distributions.  [place]-Maryann 5.07 filament (10g) testing  deferred.    Cardiovascular Brisk capillary refill < 2 seconds,intact DP and PT pulses    Special Tests None      Imaging Studies:   No new imaging      Scribe Attestation      I,:  Huy Urbina am acting as a scribe while in the presence of the attending physician.:       I,:  James R Lachman, MD personally performed the services described in this documentation    as scribed in my presence.:                 James R. Lachman, MD  Foot & Ankle Surgery   Department of Orthopaedic Surgery  [facility]      I personally performed the service.    James R. Lachman, MD

## (undated) DEVICE — SUT ETHILON 3-0 PS-1 18 IN 1663G

## (undated) DEVICE — GLOVE INDICATOR PI UNDERGLOVE SZ 8 BLUE

## (undated) DEVICE — CUFF TOURNIQUET 30 X 4 IN QUICK CONNECT DISP 1BLA

## (undated) DEVICE — BANDAGE, ESMARK LF STR 6"X9' (20/CS): Brand: CYPRESS

## (undated) DEVICE — SUT VICRYL 2-0 CT-2 18 IN J726D

## (undated) DEVICE — PADDING CAST 4 IN  COTTON STRL

## (undated) DEVICE — SPLINT 5 X 30 IN FAST SET PLASTER

## (undated) DEVICE — GAUZE SPONGES,16 PLY: Brand: CURITY

## (undated) DEVICE — ACE WRAP 6 IN UNSTERILE

## (undated) DEVICE — DRAPE SHEET THREE QUARTER

## (undated) DEVICE — INTENDED FOR TISSUE SEPARATION, AND OTHER PROCEDURES THAT REQUIRE A SHARP SURGICAL BLADE TO PUNCTURE OR CUT.: Brand: BARD-PARKER ® CARBON RIB-BACK BLADES

## (undated) DEVICE — BETHLEHEM UNIVERSAL  MIONR EXT: Brand: CARDINAL HEALTH

## (undated) DEVICE — GLOVE SRG BIOGEL 7.5

## (undated) DEVICE — 3M™ MICROFOAM™ SURGICAL TAPE 4 ROLLS/CARTON 6 CARTONS/CASE 1528-3: Brand: 3M™ MICROFOAM™

## (undated) DEVICE — PREP SURGICAL PURPREP 26ML

## (undated) DEVICE — BRUSH EZ SCRUB PCMX W/NAIL CLEANER

## (undated) DEVICE — PAD CAST 6 IN COTTON NON STERILE

## (undated) DEVICE — CAST PADDING 6IN UNSTERILE

## (undated) DEVICE — ABDOMINAL PAD: Brand: DERMACEA

## (undated) DEVICE — OCCLUSIVE GAUZE STRIP,3% BISMUTH TRIBROMOPHENATE IN PETROLATUM BLEND: Brand: XEROFORM

## (undated) DEVICE — PENCIL ELECTROSURG E-Z CLEAN -0035H

## (undated) DEVICE — 3M™ DURAPORE™ SURGICAL TAPE 1538-1, 1 INCH X 10 YARD (2,5CM X 9,1M), 12 ROLLS/BOX: Brand: 3M™ DURAPORE™

## (undated) DEVICE — GLOVE SRG BIOGEL 8

## (undated) DEVICE — ANTIBACTERIAL UNDYED BRAIDED (POLYGLACTIN 910), SYNTHETIC ABSORBABLE SUTURE: Brand: COATED VICRYL

## (undated) DEVICE — SUT VICRYL 0 CT-1 36 IN J946H

## (undated) DEVICE — MEDI-VAC YANKAUER SUCTION HANDLE W/STRAIGHT TIP & CONTROL VENT: Brand: CARDINAL HEALTH